# Patient Record
Sex: FEMALE | Race: WHITE | NOT HISPANIC OR LATINO | Employment: OTHER | URBAN - METROPOLITAN AREA
[De-identification: names, ages, dates, MRNs, and addresses within clinical notes are randomized per-mention and may not be internally consistent; named-entity substitution may affect disease eponyms.]

---

## 2017-07-02 ENCOUNTER — OFFICE VISIT - HEALTHEAST (OUTPATIENT)
Dept: FAMILY MEDICINE | Facility: CLINIC | Age: 67
End: 2017-07-02

## 2017-07-02 DIAGNOSIS — M79.602 LEFT ARM PAIN: ICD-10-CM

## 2017-07-02 ASSESSMENT — MIFFLIN-ST. JEOR: SCORE: 1286.11

## 2017-07-19 ENCOUNTER — RECORDS - HEALTHEAST (OUTPATIENT)
Dept: ADMINISTRATIVE | Facility: OTHER | Age: 67
End: 2017-07-19

## 2017-08-23 ENCOUNTER — RECORDS - HEALTHEAST (OUTPATIENT)
Dept: ADMINISTRATIVE | Facility: OTHER | Age: 67
End: 2017-08-23

## 2017-09-14 ENCOUNTER — RECORDS - HEALTHEAST (OUTPATIENT)
Dept: ADMINISTRATIVE | Facility: OTHER | Age: 67
End: 2017-09-14

## 2017-09-14 ENCOUNTER — OFFICE VISIT - HEALTHEAST (OUTPATIENT)
Dept: FAMILY MEDICINE | Facility: CLINIC | Age: 67
End: 2017-09-14

## 2017-09-14 DIAGNOSIS — M79.622 LEFT UPPER ARM PAIN: ICD-10-CM

## 2017-09-19 ENCOUNTER — RECORDS - HEALTHEAST (OUTPATIENT)
Dept: ADMINISTRATIVE | Facility: OTHER | Age: 67
End: 2017-09-19

## 2017-09-25 ENCOUNTER — RECORDS - HEALTHEAST (OUTPATIENT)
Dept: ADMINISTRATIVE | Facility: OTHER | Age: 67
End: 2017-09-25

## 2018-06-28 ENCOUNTER — HOSPITAL ENCOUNTER (EMERGENCY)
Facility: CLINIC | Age: 68
Discharge: HOME | End: 2018-06-28
Attending: STUDENT IN AN ORGANIZED HEALTH CARE EDUCATION/TRAINING PROGRAM | Admitting: STUDENT IN AN ORGANIZED HEALTH CARE EDUCATION/TRAINING PROGRAM
Payer: MEDICAID

## 2018-06-28 VITALS
TEMPERATURE: 98.1 F | SYSTOLIC BLOOD PRESSURE: 159 MMHG | OXYGEN SATURATION: 96 % | DIASTOLIC BLOOD PRESSURE: 91 MMHG | WEIGHT: 160 LBS | HEART RATE: 92 BPM | HEIGHT: 67 IN | BODY MASS INDEX: 25.11 KG/M2 | RESPIRATION RATE: 18 BRPM

## 2018-06-28 DIAGNOSIS — S81.811A LACERATION OF RIGHT LOWER LEG, INITIAL ENCOUNTER: ICD-10-CM

## 2018-06-28 PROBLEM — M54.9 CHRONIC BACK PAIN: Status: ACTIVE | Noted: 2018-06-28

## 2018-06-28 PROBLEM — G93.32 CHRONIC FATIGUE SYNDROME: Status: ACTIVE | Noted: 2018-06-28

## 2018-06-28 PROBLEM — F64.0 GENDER DYSPHORIA IN ADULT: Status: ACTIVE | Noted: 2017-02-21

## 2018-06-28 PROBLEM — F98.8 ADD (ATTENTION DEFICIT DISORDER): Status: ACTIVE | Noted: 2018-06-28

## 2018-06-28 PROBLEM — G89.29 CHRONIC BACK PAIN: Status: ACTIVE | Noted: 2018-06-28

## 2018-06-28 PROBLEM — E03.9 HYPOTHYROIDISM: Status: ACTIVE | Noted: 2018-06-28

## 2018-06-28 PROBLEM — F84.0 AUTISM SPECTRUM DISORDER: Status: ACTIVE | Noted: 2018-06-28

## 2018-06-28 PROBLEM — F41.9 ANXIETY: Status: ACTIVE | Noted: 2018-06-28

## 2018-06-28 PROCEDURE — 90715 TDAP VACCINE 7 YRS/> IM: CPT | Performed by: STUDENT IN AN ORGANIZED HEALTH CARE EDUCATION/TRAINING PROGRAM

## 2018-06-28 PROCEDURE — 12002 RPR S/N/AX/GEN/TRNK2.6-7.5CM: CPT | Mod: RT | Performed by: STUDENT IN AN ORGANIZED HEALTH CARE EDUCATION/TRAINING PROGRAM

## 2018-06-28 PROCEDURE — 99283 EMERGENCY DEPT VISIT LOW MDM: CPT | Mod: 25 | Performed by: STUDENT IN AN ORGANIZED HEALTH CARE EDUCATION/TRAINING PROGRAM

## 2018-06-28 PROCEDURE — 90471 IMMUNIZATION ADMIN: CPT | Performed by: STUDENT IN AN ORGANIZED HEALTH CARE EDUCATION/TRAINING PROGRAM

## 2018-06-28 PROCEDURE — 99283 EMERGENCY DEPT VISIT LOW MDM: CPT | Performed by: STUDENT IN AN ORGANIZED HEALTH CARE EDUCATION/TRAINING PROGRAM

## 2018-06-28 PROCEDURE — 25000128 H RX IP 250 OP 636: Performed by: STUDENT IN AN ORGANIZED HEALTH CARE EDUCATION/TRAINING PROGRAM

## 2018-06-28 PROCEDURE — 99282 EMERGENCY DEPT VISIT SF MDM: CPT | Mod: 25 | Performed by: STUDENT IN AN ORGANIZED HEALTH CARE EDUCATION/TRAINING PROGRAM

## 2018-06-28 RX ORDER — BUPIVACAINE HYDROCHLORIDE 2.5 MG/ML
INJECTION, SOLUTION INFILTRATION; PERINEURAL
Status: DISCONTINUED
Start: 2018-06-28 | End: 2018-06-29 | Stop reason: HOSPADM

## 2018-06-28 RX ADMIN — CLOSTRIDIUM TETANI TOXOID ANTIGEN (FORMALDEHYDE INACTIVATED), CORYNEBACTERIUM DIPHTHERIAE TOXOID ANTIGEN (FORMALDEHYDE INACTIVATED), BORDETELLA PERTUSSIS TOXOID ANTIGEN (GLUTARALDEHYDE INACTIVATED), BORDETELLA PERTUSSIS FILAMENTOUS HEMAGGLUTININ ANTIGEN (FORMALDEHYDE INACTIVATED), BORDETELLA PERTUSSIS PERTACTIN ANTIGEN, AND BORDETELLA PERTUSSIS FIMBRIAE 2/3 ANTIGEN 0.5 ML: 5; 2; 2.5; 5; 3; 5 INJECTION, SUSPENSION INTRAMUSCULAR at 22:59

## 2018-06-28 NOTE — ED AVS SNAPSHOT
[facility] Emergency Department    [address] [place] [address]    Phone:  [phone]    Fax:  [phone]                                       Carlita Allen   MRN: [id number]    Department:  [facility] Emergency Department   Date of Visit:  6/28/2018           Patient Information     Date Of Birth          1950        Your diagnoses for this visit were:     Laceration of right lower leg, initial encounter        You were seen by Blake Marin DO.      Follow-up Information     Follow up with Diane Bucio MD. Schedule an appointment as soon as possible for a visit in 10 days.    Specialty:  Family Practice    Why:  For reevaluation of wound and suture removal.    Contact information:    [address] [place] [address]          Discharge Instructions          * LACERATION (All Closures)  A laceration is a cut through the skin. This will usually require stitches (sutures) or staples if it is deep. Minor cuts may be treated with a tape closure ( Steri-Strips ) or Dermabond skin glue.       HOME CARE:  PAIN MEDICINE: You may use acetaminophen (Tylenol) 650-1000 mg every 6 hours or ibuprofen (Motrin, Advil) 600 mg every 6-8 hours with food to control pain, if you are able to take these medicines. [NOTE: If you have chronic liver or kidney disease or ever had a stomach ulcer or GI bleeding, talk with your doctor before using these medicines.]  EXTREMITY, FACE or TRUNK WOUNDS:    Keep the wound clean and dry. If a bandage was applied and it becomes wet or dirty, replace it. Otherwise, leave it in place for the first 24 hours.    If stitches or staples were used, clean the wound daily. Protect the wound from sunlight and tanning lamps.    After removing the bandage, wash the area with soap and water. Use a wet cotton swab (Q tip) to loosen and remove any blood or crust that forms.    After cleaning, apply a thin layer of Polysporin or Bacitracin ointment. This will keep the wound clean  and make it easier to remove the stitches or staples. Reapply a fresh bandage.    You may remove the bandage to shower as usual after the first 24 hours, but do not soak the area in water (no swimming) until the stitches or staples are removed.    If Steri-Strips were used, keep the area clean and dry. If it becomes wet, blot it dry with a towel. It is okay to take a brief shower, but avoid scrubbing the area.    If Dermabond skin adhesive was used, do not scratch, rub or pick at the adhesive film. Do not place tape directly over the film. Do not apply liquid, ointment or creams to the wound while the film is in place. Do not clean the wound with peroxide and do not apply ointments. Avoid activities that cause heavy sweating until the film has fallen off. Protect the wound from prolonged exposure to sunlight or tanning lamps. You may shower as usual but do not soak the wound in water (no baths or swimming). The film will fall off by itself in 5-10 days.  SCALP WOUNDS: During the first two days, you may carefully rinse your hair in the shower to remove blood, glass or dirt particles. After two days, you may shower and shampoo your hair normally. Do not soak your scalp in the tub or go swimming until the stitches or staples have been removed.  MOUTH WOUNDS: Eat soft foods to reduce pain. If the cut is inside of your mouth, clean by rinsing after each meal and at bedtime with a mixture of equal parts water and Hydrogen Peroxide (do not swallow!). Or, you can use a cotton swab to directly apply Hydrogen Peroxide onto the cut.  After the wound is done healing, use sunscreen over the area whenever exposed for the next 6 minths to avoid a darker scar.     FOLLOW UP: Most skin wounds heal within ten days. Mouth and facial wounds heal within five days. However, even with proper treatment, a wound infection may sometimes occur. Therefore, you should check the wound daily for signs of infection listed below.  Stitches should  be removed from the face within five days; stitches and staples should be removed from other parts of the body within 7-10 days. Unless you are told to come back to the emergency room, you may have your doctor or urgent care remove the stitches. If dissolving stitches were used in the mouth, these will fall out or dissolve without the need for removal. If tape closures ( Steri-Strips ) were used, remove them yourself if they have not fallen off after 7 days. If Dermabond skin glue was used, the film will fall off by itself in 5-10 days.      GET PROMPT MEDICAL ATTENTION  if any of the following occur:    Increasing pain in the wound    Redness, swelling or pus coming from the wound    Fever over 101 F (38.3 C) oral    If stitches or staples come apart or fall out or if Steri-Strips fall off before seven days    If the wound edges re-open    Bleeding not controlled by direct pressure    5289-2229 [employer]. [address] [place] [address], [place] [address]. All rights reserved. This information is not intended as a substitute for professional medical care. Always follow your healthcare professional's instructions.  This information has been modified by your health care provider with permission from the publisher.      24 Hour Appointment Hotline       To make an appointment at any [facility], call [phone] ([phone]). If you don't have a family doctor or clinic, we will help you find one. [facility] are conveniently located to serve the needs of you and your family.             Review of your medicines      Our records show that you are taking the medicines listed below. If these are incorrect, please call your family doctor or clinic.        Dose / Directions Last dose taken    LEVOTHYROXINE SODIUM PO        Refills:  0        loratadine 10 MG tablet   Commonly known as:  CLARITIN   Dose:  10 mg        Take 10 mg by mouth daily   Refills:  0        predniSONE 20 MG tablet   Commonly  "known as:  DELTASONE   Quantity:  10 tablet        Take two tablets (= 40mg) each day for 5 (five) days   Refills:  0        PROGESTERONE MICRONIZED PO        Refills:  0                Orders Needing Specimen Collection     None      Pending Results     No orders found from 2018 to 2018.            Pending Culture Results     No orders found from 2018 to 2018.            Pending Results Instructions     If you had any lab results that were not finalized at the time of your Discharge, you can call the ED Lab Result RN at [phone]. You will be contacted by this team for any positive Lab results or changes in treatment. The nurses are available 7 days a week from 10A to 6:30P.  You can leave a message 24 hours per day and they will return your call.        Test Results From Your Hospital Stay               Thank you for choosing [facility]       Thank you for choosing [facility] for your care. Our goal is always to provide you with excellent care. Hearing back from our patients is one way we can continue to improve our services. Please take a few minutes to complete the written survey that you may receive in the mail after you visit with us. Thank you!        [facility] Information     [employer] lets you send messages to your doctor, view your test results, renew your prescriptions, schedule appointments and more. To sign up, go to [email or web address] . Click on \"Log in\" on the left side of the screen, which will take you to the Welcome page. Then click on \"Sign up Now\" on the right side of the page.     You will be asked to enter the access code listed below, as well as some personal information. Please follow the directions to create your username and password.     Your access code is: [email or web address]  Expires: 2018 11:27 PM     Your access code will  in 90 days. If you need help or a new code, please call your [facility] clinic or [phone].        Care EveryWhere ID     This is your " Care EveryWhere ID. This could be used by other organizations to access your [facility] medical records  [id number]        Equal Access to Services     ALEXI APPLE : Shawn Celaya, sergio boothe, adriano chambers, stanley bingham. So [facility] [phone].    ATENCIÓN: Si habla español, tiene a felipe disposición servicios gratuitos de asistencia lingüística. Llame al [phone].    We comply with applicable federal civil rights laws and [place] laws. We do not discriminate on the basis of race, color, national origin, age, disability, sex, sexual orientation, or gender identity.            After Visit Summary       This is your record. Keep this with you and show to your community pharmacist(s) and doctor(s) at your next visit.

## 2018-06-28 NOTE — ED AVS SNAPSHOT
[facility] Emergency Department    [address] [facility] [address]    Phone:  [phone]    Fax:  [phone]                                       Carlita Allen   MRN: [id number]    Department:  [facility] Emergency Department   Date of Visit:  6/28/2018           After Visit Summary Signature Page     I have received my discharge instructions, and my questions have been answered. I have discussed any challenges I see with this plan with the nurse or doctor.    ..........................................................................................................................................  Patient/Patient Representative Signature      ..........................................................................................................................................  Patient Representative Print Name and Relationship to Patient    ..................................................               ................................................  Date                                            Time    ..........................................................................................................................................  Reviewed by Signature/Title    ...................................................              ..............................................  Date                                                            Time

## 2018-06-29 NOTE — ED NOTES
Cut leg around 1930 on edge of aluminum ramp  Has approx 3cm lac to leg  Bleeding is controlled  Needs Tdap

## 2018-06-29 NOTE — DISCHARGE INSTRUCTIONS
* LACERATION (All Closures)  A laceration is a cut through the skin. This will usually require stitches (sutures) or staples if it is deep. Minor cuts may be treated with a tape closure ( Steri-Strips ) or Dermabond skin glue.       HOME CARE:  PAIN MEDICINE: You may use acetaminophen (Tylenol) 650-1000 mg every 6 hours or ibuprofen (Motrin, Advil) 600 mg every 6-8 hours with food to control pain, if you are able to take these medicines. [NOTE: If you have chronic liver or kidney disease or ever had a stomach ulcer or GI bleeding, talk with your doctor before using these medicines.]  EXTREMITY, FACE or TRUNK WOUNDS:    Keep the wound clean and dry. If a bandage was applied and it becomes wet or dirty, replace it. Otherwise, leave it in place for the first 24 hours.    If stitches or staples were used, clean the wound daily. Protect the wound from sunlight and tanning lamps.    After removing the bandage, wash the area with soap and water. Use a wet cotton swab (Q tip) to loosen and remove any blood or crust that forms.    After cleaning, apply a thin layer of Polysporin or Bacitracin ointment. This will keep the wound clean and make it easier to remove the stitches or staples. Reapply a fresh bandage.    You may remove the bandage to shower as usual after the first 24 hours, but do not soak the area in water (no swimming) until the stitches or staples are removed.    If Steri-Strips were used, keep the area clean and dry. If it becomes wet, blot it dry with a towel. It is okay to take a brief shower, but avoid scrubbing the area.    If Dermabond skin adhesive was used, do not scratch, rub or pick at the adhesive film. Do not place tape directly over the film. Do not apply liquid, ointment or creams to the wound while the film is in place. Do not clean the wound with peroxide and do not apply ointments. Avoid activities that cause heavy sweating until the film has fallen off. Protect the wound from prolonged  exposure to sunlight or tanning lamps. You may shower as usual but do not soak the wound in water (no baths or swimming). The film will fall off by itself in 5-10 days.  SCALP WOUNDS: During the first two days, you may carefully rinse your hair in the shower to remove blood, glass or dirt particles. After two days, you may shower and shampoo your hair normally. Do not soak your scalp in the tub or go swimming until the stitches or staples have been removed.  MOUTH WOUNDS: Eat soft foods to reduce pain. If the cut is inside of your mouth, clean by rinsing after each meal and at bedtime with a mixture of equal parts water and Hydrogen Peroxide (do not swallow!). Or, you can use a cotton swab to directly apply Hydrogen Peroxide onto the cut.  After the wound is done healing, use sunscreen over the area whenever exposed for the next 6 minths to avoid a darker scar.     FOLLOW UP: Most skin wounds heal within ten days. Mouth and facial wounds heal within five days. However, even with proper treatment, a wound infection may sometimes occur. Therefore, you should check the wound daily for signs of infection listed below.  Stitches should be removed from the face within five days; stitches and staples should be removed from other parts of the body within 7-10 days. Unless you are told to come back to the emergency room, you may have your doctor or urgent care remove the stitches. If dissolving stitches were used in the mouth, these will fall out or dissolve without the need for removal. If tape closures ( Steri-Strips ) were used, remove them yourself if they have not fallen off after 7 days. If Dermabond skin glue was used, the film will fall off by itself in 5-10 days.      GET PROMPT MEDICAL ATTENTION  if any of the following occur:    Increasing pain in the wound    Redness, swelling or pus coming from the wound    Fever over 101 F (38.3 C) oral    If stitches or staples come apart or fall out or if Steri-Strips fall  off before seven days    If the wound edges re-open    Bleeding not controlled by direct pressure    [id number] [employer]. [address] [place] [address], [place] [address]. All rights reserved. This information is not intended as a substitute for professional medical care. Always follow your healthcare professional's instructions.  This information has been modified by your health care provider with permission from the publisher.

## 2018-06-29 NOTE — ED PROVIDER NOTES
History     Chief Complaint   Patient presents with     Laceration     right shin   cut on aluminium  ramp        HPI  Carlita Allen is a 67 year old female who presents for evaluation of right lower extremity laceration sustained around 7:30 PM this evening.  Patient explains that they had recently had a new aluminum ramp installed for access to the home but while walking around today sharp piece of aluminum cut across her right anterior shin.  She immediately felt pain and noted bleeding from the laceration.  Patient applied pressure and bleeding ceased prior to arrival to the department.  She knows it has been many years since her last tetanus shot and is requesting the immunization.  She denies knee pain, ankle pain, sensory deficits or weakness.  No other appreciable injuries.    Problem List:    Patient Active Problem List    Diagnosis Date Noted     ADD (attention deficit disorder) 06/28/2018     Priority: Medium     Anxiety 06/28/2018     Priority: Medium     Autism spectrum disorder 06/28/2018     Priority: Medium     Chronic back pain 06/28/2018     Priority: Medium     Chronic fatigue syndrome 06/28/2018     Priority: Medium     Overview:   Treated by Dr. Larry St with high dose testosterone and 2 thyroid   medications with good results.       Hypothyroidism 06/28/2018     Priority: Medium     Overview:   Presumed diagnosis as normal free T4 and normal free T3 on meds started   before getting care @ [facility]       Gender dysphoria in adult 02/21/2017     Priority: Medium     Depressive disorder 04/23/2007     Priority: Medium     Overview:   Depressive disorder, not elsewhere classified ([facility])       Residual type schizophrenic disorder, chronic condition (H) 04/23/2007     Priority: Medium        Past Medical History:    No past medical history on file.    Past Surgical History:    No past surgical history on file.    Family History:    No family history on file.    Social History:  Marital Status:   "Single [1]  Social History   Substance Use Topics     Smoking status: Never Smoker     Smokeless tobacco: Not on file     Alcohol use No        Medications:      LEVOTHYROXINE SODIUM PO   loratadine (CLARITIN) 10 MG tablet   predniSONE (DELTASONE) 20 MG tablet   PROGESTERONE MICRONIZED PO         Review of Systems  Constitutional:  Negative for fever or illness.  Musculoskeletal: Negative for knee pain, ankle pain, or bony injury.  Neurological:  Negative for sensory deficit or weakness.  Skin: Positive for right shin laceration.    All others reviewed and are negative.      Physical Exam   BP: (!) 159/91  Pulse: 92  Temp: 98.1  F (36.7  C)  Resp: 18  Height: 170.2 cm (5' 7\")  Weight: 72.6 kg (160 lb)  SpO2: 96 %      Physical Exam  Constitutional:  Well developed, well nourished.  Appears nontoxic and in no acute distress.   HENT:  Normocephalic and atraumatic.  Eyes:  Conjunctivae are normal.  Neck:  Neck supple.  Cardiovascular:  No cyanosis.   Respiratory:  Effort normal, no respiratory distress.   Musculoskeletal:  Knees are relatively symmetric in comparison.  No significant effusion, swelling, or inflammation.  No overlying contusions or ecchymosis.  Patient denies tenderness to palpation of the patella, distal femur, proximal or distal tibia/fibula.    Neurological:  Patient is alert.  Skin:  Skin is warm and dry.  4 cm laceration across right anterior tibia without identifiable foreign body or significant debris.  Psychiatric:  Normal mood and affect.      ED Course     ED Course     Procedures          [facility] Procedure Note       Procedure:  Laceration Repair   Performed by:  Blake Marin    Consent:  Patient who states an understanding of the procedure being performed after discussing the risks, benefits, and alternatives of the agreed method of wound repair.  They understand that although the wound has been cleaned/irrigated thoroughly, we cannot with absolute certainty prevent " infection and they must monitor the healing process closely for signs of infection.  Also, all skin wounds will form a scar but the repair will improve the cosmetic outcomes.    Body area: Right lower extremity  Laceration length:  4 cm  Contamination:  The wound is not contaminated.  Foreign bodies after meticulous investigation:  none  Tendon involvement:  none  Nerve involvement:  Sensation completely intact.  Anesthesia type:  Local  Local anesthetic agent:  Bupivacaine 0.5%  Anesthetic total:  4 ml  Irrigation:  Copious irrigation via normal saline.  Preparation:  Patient was cleaned and draped in usual sterile fashion for repair.    Debridement:  none  Skin closure:  Closed with 6 x 3.0 Ethilon  Technique:  interrupted  Approximation:  Close  Approximation difficulty:  simple    Patient tolerance: Patient tolerated the procedure well with no immediate complications.    Nursing staff was assigned to apply antibiotic ointment and dress the wound after the repair.     They have been instructed to monitor wound healing closely for any signs of infection.          Critical Care time:  none               No results found for this or any previous visit (from the past 24 hour(s)).    Medications   bupivacaine (MARCAINE) 0.25 % injection (not administered)   Tdap (tetanus-diphtheria-acell pertussis) (ADACEL) injection 0.5 mL (0.5 mLs Intramuscular Given 6/28/18 1034)       Assessments & Plan (with Medical Decision Making)   Carlita Allen is a 67 year old female who presents to the department for evaluation of right lower extremity laceration which occurred 3 hours prior to arrival. Based on her symptoms and the clinical examination, there is no evidence to suggest bony injury, tendon laceration, or neurovascular deficits. Her wound was cleaned, irrigated, thoroughly inspected and no foreign body or heavy contamination found.  Radiographic imaging deemed unnecessary.  The laceration repaired, wound edges well  approximated, and the patient tolerated well without complications.  After repair antibiotic ointment was applied and the wound was dressed.  They were instructed to remove the bandage within 24 hours and apply OTC antibiotic ointment twice daily while healing.  Recommended follow-up in 8-10 days at a primary care clinic for reevaluation of the wound and removal of sutures.     During the repair we discussed the likelihood that there will be some residual scarring.  Although the wound was thoroughly cleaned/irrigated, she has been instructed to monitor the wound closely for healing or signs of infection such as increasing pain, redness, discharge, or fever.  If any are present they should return to the emergency department.      Disclaimer: This note consists of symbols derived from keyboarding, dictation, and/or voice recognition software. As a result, there may be errors in the script that have gone undetected.  Please consider this when interpreting information found in the chart.         I have reviewed the nursing notes.    I have reviewed the findings, diagnosis, plan and need for follow up with the patient.       Discharge Medication List as of 6/28/2018 11:28 PM          Final diagnoses:   Laceration of right lower leg, initial encounter       6/28/2018   [facility] EMERGENCY DEPARTMENT     Blake Marin, DO  06/29/18 0046

## 2018-08-24 ENCOUNTER — OFFICE VISIT - HEALTHEAST (OUTPATIENT)
Dept: FAMILY MEDICINE | Facility: CLINIC | Age: 68
End: 2018-08-24

## 2018-08-24 DIAGNOSIS — M25.561 RIGHT KNEE PAIN: ICD-10-CM

## 2020-06-25 ENCOUNTER — NURSE TRIAGE (OUTPATIENT)
Dept: NURSING | Facility: CLINIC | Age: 70
End: 2020-06-25

## 2020-06-26 NOTE — TELEPHONE ENCOUNTER
"Patient states she has a lump on gum/inside of her mouth. She noticed the lump a few days ago, but looked at the lump today. Lump is 1/4th the size of a dime. She googled her symptoms and found out it could be an abscess. Lump is painful, rates it 4-5/10. Temp 98.5. Feel feverish. Swallowing fluids okay. No facial swelling. Reports her tooth \"feels funny for awhile and aches\" Reports she's had a ear ache and headache for awhile. Advised per protocol to see physician within 24 hours. Patent states she goes to [facility] and that is who she was trying to call. Advised patient to try calling them tomorrow to make an appointment with a dentist. And if they don't have any availability then trying to find another dentist.     Jeri Patel RN/[facility]    COVID 19 Nurse Triage Plan/Patient Instructions    Please be aware that novel coronavirus (COVID-19) may be circulating in the community. If you develop symptoms such as fever, cough, or SOB or if you have concerns about the presence of another infection including coronavirus (COVID-19), please contact your health care provider or visit [email or web address].     Disposition/Instructions    Patient to schedule an In Person Visit with provider. Reference Visit Selection Guide.    Thank you for taking steps to prevent the spread of this virus.  o Limit your contact with others.  o Wear a simple mask to cover your cough.  o Wash your hands well and often.    Resources    [facility]: About COVID-19: [email or web address]    CDC: What to Do If You're Sick: www.cdc.gov/coronavirus/2019-ncov/about/steps-when-sick.html    CDC: Ending Home Isolation: www.cdc.gov/coronavirus/2019-ncov/hcp/disposition-in-home-patients.html     CDC: Caring for Someone: www.cdc.gov/coronavirus/2019-ncov/if-you-are-sick/care-for-someone.html     MD: Interim Guidance for Hospital Discharge to Home: " "www.health.[facility].[place]/diseases/coronavirus/hcp/hospdischarge.pdf    [facility] clinical trials (COVID-19 research studies): [email or web address]/umn-[email or web address]-trials     Below are the COVID-19 hotlines at the [place] Department of Health ([facility]). Interpreters are available.   o For health questions: Call 792-229-0594 or 1-233.461.3789 (7 a.m. to 7 p.m.)  o For questions about schools and childcare: Call 419-993-2559 or 1-668.870.5092 (7 a.m. to 7 p.m.)       Additional Information    Negative: Large blisters in mouth (i.e., fluid filled bubbles or sacs)    Negative: Chemical in the mouth suspected cause of ulcers    Negative: [1] Drinking very little AND [2] dehydration suspected (e.g., no urine > 12 hours, very dry mouth, very lightheaded)    Negative: Generalized rash on body    Negative: Patient sounds very sick or weak to the triager    Negative: Gums are red, painful and have many ulcers    Negative: Fever    [1] One pimple or ulcer on the gum AND [2] near a toothache    Negative: [1] Looks like fever blisters (\"cold sore\") AND [2] only on outer lip    Negative: Generalized skin rash from Chickenpox    Facial swelling    Protocols used: MOUTH ULCERS-A-AH      "

## 2021-05-31 VITALS — WEIGHT: 163 LBS | BODY MASS INDEX: 26.31 KG/M2

## 2021-05-31 VITALS — BODY MASS INDEX: 26.2 KG/M2 | WEIGHT: 163 LBS | HEIGHT: 66 IN

## 2021-06-01 VITALS — BODY MASS INDEX: 25.22 KG/M2 | WEIGHT: 161 LBS

## 2021-06-11 NOTE — PROGRESS NOTES
"ASSESSMENT:   1. Left arm pain  XR Humerus Left 2 VWS    Ambulatory referral to Orthopedics        PLAN:  Suspect biceps tendonitis.  No bulge, h/o of \"pop\" or \"snap\" to suggest bicep tendon rupture.  Recommend rest, ice, elevate.  May use ibuprofen 400mg every 6-8 hours as needed for pain. Limit use to 5-7 days.  Take each dose with food. Referred to [facility] - to be seen in 1-2 weeks. Due to the chronicity of her pain, she may benefit from further evaluation of the biceps tendon with advanced imaging vs PT.     SUBJECTIVE:   Carlita Allen is a 66 y.o. female presents today with 2 months complaint of left upper arm pain. She points midway up the upper arm as the indicated area of pain.  She cannot recall any injury/trauma or recent heavy lifting.  Rates pain at a 8 out of 10.  Aggravated by movement of arm away from the body.  Alleviated by nothing.  She has attempted treatment with ice, heat, and massage without relief.  She reports a left humerus fracture in her 20's after falling off a horse but has not had chronic pain due to this. She is quite active.    Denies numbness/tingling, swelling or bruising.    There is no problem list on file for this patient.      History   Smoking Status     Never Smoker   Smokeless Tobacco     Never Used       Current Medications:  No current outpatient prescriptions on file prior to visit.     No current facility-administered medications on file prior to visit.        Allergies:   Allergies   Allergen Reactions     Metrizamide Other (See Comments)     Penicillins Other (See Comments)       OBJECTIVE:   Vitals:    07/02/17 1510   BP: 120/80   Patient Site: Right Arm   Patient Position: Sitting   Cuff Size: Adult Regular   Pulse: 86   Resp: 18   Temp: 97.6  F (36.4  C)   TempSrc: Oral   SpO2: 97%   Weight: 163 lb (73.9 kg)   Height: 5' 6\" (1.676 m)     Physical exam reveals a  66 y.o. female.   Appears healthy, alert, cooperative and in NAD.  Lungs: Chest is clear, no " wheezing, rhonchi or rales. Symmetric air entry throughout both lung fields.  Heart: regular rate and rhythm, no murmur, rub or gallop  Extremity: she has no swelling, ecchymosis, erythema, or warmth of the upper arm. She has tenderness of the body of the left biceps muscle. No tenderness of the biceps tendon, anterior deltoid, AC joint, or elbow.  Full AROM of the arm - tenderness of the indicated area with flexion at the elbow and abduction of the arm.    Skin: pink, warm, dry, and without lesions.    I personally did a preliminary review of xray: calcification midshaft humerus, from previous fx. No new fracture.  (pending final review by radiologist)

## 2021-06-13 NOTE — PROGRESS NOTES
Chief Complaint   Patient presents with     Arm Pain     was seen on 7/2 and has gotten worst. Left arm. Has a tare in her biceps       HPI    Patient is here for gradually worsening left upper arm pain since 7/2, not improved with OTC analgesics. Pain is now severe, constant, worsens with movement of arm. She was seen by Ortho and told to have ruptured biceps tendon and was treated conservatively. No fever, chills, tingling/numbness of left arm. No new injuries.     ROS: Pertinent ROS noted in HPI.     Allergies   Allergen Reactions     Metrizamide Other (See Comments)     Contrast dye     Penicillins Other (See Comments)       There is no problem list on file for this patient.      No family history on file.    Social History     Social History     Marital status: Single     Spouse name: N/A     Number of children: N/A     Years of education: N/A     Occupational History     Not on file.     Social History Main Topics     Smoking status: Never Smoker     Smokeless tobacco: Never Used     Alcohol use Not on file     Drug use: Not on file     Sexual activity: Not on file     Other Topics Concern     Not on file     Social History Narrative         Objective:    Vitals:    09/14/17 1826   BP: 120/78   Pulse: 98   Resp: 12   Temp: 98.1  F (36.7  C)   SpO2: 98%       Gen:NAD  MSK: normal inspection of left upper extremity. Moderate pain at left biceps without mass. Full ROM and strength of bilateral upper extremities.   Neuro: intact and symmetric gross sensation of bilateral upper extremities.       Left upper arm pain  -     HYDROcodone-acetaminophen 5-325 mg per tablet; Take 1 tablet by mouth every 4 (four) hours as needed for pain.      Recommended f/u with [facility] tomorrow (she was seen by [facility] for this problems previously).        77

## 2021-06-20 NOTE — PROGRESS NOTES
Subjective:      Patient ID: Carlita Allen is a 68 y.o. female.    Chief Complaint:    HPI  Carlita Allen is a 68 y.o. female who presents today complaining of right-sided knee pain.  Patient was seen by the [facility] today and had an MRI of the right knee.  He has follow-up for reevaluation on Monday.  Patient is ostensibly here for pain medication for knee pain for that evaluation.  She states that they did not give him medication at the [facility].  States he is having 9 out of 10 knee pain is constant and he would like to have something other than over-the-counter NSAID for relief.    No past medical history on file.    No past surgical history on file.    No family history on file.    Social History   Substance Use Topics     Smoking status: Never Smoker     Smokeless tobacco: Never Used     Alcohol use None       Review of Systems  As above in HPI otherwise negative  Objective:     /60  Pulse 82  Temp 98  F (36.7  C) (Oral)   Resp 16  Wt 161 lb (73 kg)  SpO2 98%  BMI 25.99 kg/m2    Physical Exam  General: Patient is ambulate into the office encounter  musculoskeletal: Right knee is without effusion.  No ecchymoses.  Patient is weightbearing and ambulating  Assessment:     Procedures    The encounter diagnosis was Right knee pain.    Plan:     Patient is continue with over-the-counter NSAID.  Pain medication as written below.  I did look on the prescription monitor webpage and the patient did not have any recent narcotic scripts filled.  Follow-up with [facility] on Monday as previously scheduled.  Patient is cautioned regarding risks and benefits of medication.    1. Right knee pain  traMADol (ULTRAM) 50 mg tablet         Patient Instructions     Tramadol as written up to 4 times per day for pain relief as needed.  Take with Tylenol for potentiation.  Patient is cautioned regarding risks and benefits of the medication to include impairment and sedation  Follow-up with  orthopedics on Monday for definitive evaluation treatment and interpretation of the MRI results    As a result of our visit today, here are the action plans for you:    1. Medication(s) to stop: There are no discontinued medications.    2. Medication(s) to start or change:   Medications Ordered   Medications     traMADol (ULTRAM) 50 mg tablet     Sig: Take 1 tablet (50 mg total) by mouth 4 (four) times a day for 3 days.     Dispense:  12 tablet     Refill:  0       3. Other instructions: Yes     Follow up with [facility] on Monday.

## 2021-08-29 ENCOUNTER — HOSPITAL ENCOUNTER (EMERGENCY)
Facility: CLINIC | Age: 71
Discharge: HOME | End: 2021-08-29
Attending: PHYSICIAN ASSISTANT | Admitting: PHYSICIAN ASSISTANT
Payer: COMMERCIAL

## 2021-08-29 VITALS
OXYGEN SATURATION: 97 % | SYSTOLIC BLOOD PRESSURE: 128 MMHG | TEMPERATURE: 98.4 F | HEIGHT: 67 IN | DIASTOLIC BLOOD PRESSURE: 83 MMHG | BODY MASS INDEX: 25.9 KG/M2 | RESPIRATION RATE: 18 BRPM | WEIGHT: 165 LBS | HEART RATE: 109 BPM

## 2021-08-29 DIAGNOSIS — H57.89 REDNESS OF LEFT EYE: ICD-10-CM

## 2021-08-29 DIAGNOSIS — H57.12 DISCOMFORT OF LEFT EYE: ICD-10-CM

## 2021-08-29 PROCEDURE — 99213 OFFICE O/P EST LOW 20 MIN: CPT | Performed by: PHYSICIAN ASSISTANT

## 2021-08-29 PROCEDURE — G0463 HOSPITAL OUTPT CLINIC VISIT: HCPCS | Performed by: PHYSICIAN ASSISTANT

## 2021-08-29 RX ORDER — AMLODIPINE BESYLATE 5 MG/1
TABLET ORAL
COMMUNITY
Start: 2021-03-10

## 2021-08-29 RX ORDER — MIRABEGRON 25 MG/1
25 TABLET, FILM COATED, EXTENDED RELEASE ORAL DAILY
COMMUNITY
Start: 2021-08-08

## 2021-08-29 RX ORDER — TETRACAINE HYDROCHLORIDE 5 MG/ML
1-2 SOLUTION OPHTHALMIC ONCE
Status: DISCONTINUED | OUTPATIENT
Start: 2021-08-29 | End: 2021-08-29 | Stop reason: HOSPADM

## 2021-08-29 RX ORDER — CIPROFLOXACIN HYDROCHLORIDE 3.5 MG/ML
1-2 SOLUTION/ DROPS TOPICAL EVERY 4 HOURS
Qty: 4.2 ML | Refills: 0 | Status: SHIPPED | OUTPATIENT
Start: 2021-08-29 | End: 2021-09-05

## 2021-08-29 ASSESSMENT — ENCOUNTER SYMPTOMS
EYE REDNESS: 1
EYE DISCHARGE: 1
EYE PAIN: 1
PHOTOPHOBIA: 0
EYE ITCHING: 1
FEVER: 0
CONSTITUTIONAL NEGATIVE: 1

## 2021-08-29 ASSESSMENT — TONOMETRY
OS_IOP_MMHG: 14
OD_IOP_MMHG: 14
IOP_HANDHELD: 1

## 2021-08-29 ASSESSMENT — MIFFLIN-ST. JEOR: SCORE: 1306.07

## 2021-08-29 ASSESSMENT — VISUAL ACUITY
OS: 20/10;WITH CORRECTIVE LENSES
OU: 1
OD: 20/10;WITH CORRECTIVE LENSES

## 2021-08-29 NOTE — ED PROVIDER NOTES
History     Chief Complaint   Patient presents with     Eye Pain     left eye pain, blurred vision and itchy for 1.5 weeks       HPI  Carliat Allen is a 71 year old female who presents with complaints of left eye redness, itching, and watery drainage for the past 2 weeks.  Pt states she initially thought allergies and the smokey air were causing her symptoms however her symptoms have persisted and now her eye is painful.  She has redness along the nasal aspect of her eye.  No mattering of the eye in the mornings.  Pt wears contacts.  They are monthly contacts but she changes them out every 3-4 days.  She has continued to wear her contacts.  Her vision has become more blurry.  Pt denies trauma to the eye or getting anything in the eye.  She has slight reported foreign body sensation.  No headaches.  No reported associated infectious or allergic symptoms; patient denies fevers, chills, sneezing, rhinorrhea, nasal congestion, cough, or sore throat.        Allergies:  Allergies   Allergen Reactions     Contrast Dye Other (See Comments)     Pcn [Penicillins] Other (See Comments)       Problem List:    Patient Active Problem List    Diagnosis Date Noted     ADD (attention deficit disorder) 06/28/2018     Priority: Medium     Anxiety 06/28/2018     Priority: Medium     Autism spectrum disorder 06/28/2018     Priority: Medium     Chronic back pain 06/28/2018     Priority: Medium     Chronic fatigue syndrome 06/28/2018     Priority: Medium     Overview:   Treated by Dr. Larry St with high dose testosterone and 2 thyroid   medications with good results.       Hypothyroidism 06/28/2018     Priority: Medium     Overview:   Presumed diagnosis as normal free T4 and normal free T3 on meds started   before getting care @ [facility]       Gender dysphoria in adult 02/21/2017     Priority: Medium     Depressive disorder 04/23/2007     Priority: Medium     Overview:   Depressive disorder, not elsewhere classified ([facility])       Residual  "type schizophrenic disorder, chronic condition (H) 04/23/2007     Priority: Medium        Past Medical History:    No past medical history on file.    Past Surgical History:    No past surgical history on file.    Family History:    No family history on file.    Social History:  Marital Status:  Single [1]  Social History     Tobacco Use     Smoking status: Never Smoker     Smokeless tobacco: Never Used   Substance Use Topics     Alcohol use: No     Drug use: No        Medications:    amLODIPine (NORVASC) 5 MG tablet  ciprofloxacin (CILOXAN) 0.3 % ophthalmic solution  LEVOTHYROXINE SODIUM PO  loratadine (CLARITIN) 10 MG tablet  MYRBETRIQ 25 MG 24 hr tablet  predniSONE (DELTASONE) 20 MG tablet          Review of Systems   Constitutional: Negative.  Negative for fever.   HENT: Negative.    Eyes: Positive for pain, discharge, redness, itching and visual disturbance (blurry). Negative for photophobia.   Skin: Negative.    All other systems reviewed and are negative.      Physical Exam   BP: 128/83  Pulse: 109  Temp: 98.4  F (36.9  C)  Resp: 18  Height: 170.2 cm (5' 7\")  Weight: 74.8 kg (165 lb)  SpO2: 97 %      Physical Exam  Constitutional:       General: She is not in acute distress.     Appearance: Normal appearance. She is well-developed. She is not ill-appearing, toxic-appearing or diaphoretic.   HENT:      Head: Normocephalic and atraumatic.      Right Ear: Tympanic membrane, ear canal and external ear normal.      Left Ear: Tympanic membrane, ear canal and external ear normal.      Nose: Nose normal. No congestion or rhinorrhea.      Mouth/Throat:      Lips: Pink.      Mouth: Mucous membranes are moist.      Pharynx: Oropharynx is clear. Uvula midline. No pharyngeal swelling, oropharyngeal exudate, posterior oropharyngeal erythema or uvula swelling.      Tonsils: No tonsillar exudate or tonsillar abscesses.   Eyes:      General: Lids are normal. Vision grossly intact.         Left eye: No foreign body, discharge " or hordeolum.      Intraocular pressure: Right eye pressure is 14 mmHg. Left eye pressure is 14 mmHg. Measurements were taken using a handheld tonometer.     Extraocular Movements: Extraocular movements intact.      Conjunctiva/sclera:      Left eye: Left conjunctiva is injected. No chemosis, exudate or hemorrhage.     Pupils: Pupils are equal, round, and reactive to light.      Left eye: No corneal abrasion or fluorescein uptake.      Slit lamp exam:     Left eye: No corneal flare, corneal ulcer or hyphema.      Comments: Injection and erythema along the nasal aspect of the left conjunctiva   Cardiovascular:      Rate and Rhythm: Normal rate and regular rhythm.      Heart sounds: Normal heart sounds.   Pulmonary:      Effort: Pulmonary effort is normal.      Breath sounds: Normal breath sounds.   Musculoskeletal:      Cervical back: Normal range of motion and neck supple. No rigidity.   Lymphadenopathy:      Cervical: No cervical adenopathy.   Skin:     General: Skin is warm and dry.   Neurological:      Mental Status: She is alert and oriented to person, place, and time.   Psychiatric:         Behavior: Behavior is cooperative.         ED Course        Procedures      No results found for this or any previous visit (from the past 24 hour(s)).    Medications - No data to display    Assessments & Plan (with Medical Decision Making)     Pt is a 71 year old female who presents with complaints of left eye redness, itching, and watery drainage for the past 2 weeks.  Pt states she initially thought allergies and the smokey air were causing her symptoms however her symptoms have persisted and now her eye is painful.  She has redness along the nasal aspect of her eye.  Pt wears contacts.      Pt is afebrile on arrival.  Exam as above.  Patient is noted to have injection and erythema along the nasal aspect of the left conjunctiva on exam.  No evidence for corneal abrasion or ulceration on exam.  No foreign body.  Normal  intraocular pressures noted to both eyes.  Instructed patient to not wear a contact in her left eye while she is having symptoms.  Will start an antibiotic eyedrop to cover for potential infection that may have been triggered by her contact lens.  Instructed her to follow-up closely with an eye doctor in the next day or 2 for a more comprehensive eye exam.  Return precautions were reviewed at length.  Hand-outs were provided.    Patient was sent with Ciprofloxacin ophthalmic solution and was instructed to follow-up with [facility] in 1-2 days for continued care and management or sooner if new or worsening symptoms.  She is to return to the ED for persistent and/or worsening symptoms.  Patient expressed understanding of the diagnosis and plan and was discharged home in good condition.    I have reviewed the nursing notes.    I have reviewed the findings, diagnosis, plan and need for follow up with the patient.    Discharge Medication List as of 8/29/2021 12:57 PM      START taking these medications    Details   ciprofloxacin (CILOXAN) 0.3 % ophthalmic solution Place 1-2 drops Into the left eye every 4 hours for 7 days, Disp-4.2 mL, R-0, E-Prescribe             Final diagnoses:   Redness of left eye   Discomfort of left eye       8/29/2021   [facility] EMERGENCY DEPT      Disclaimer:  This note consists of symbols derived from keyboarding, dictation and/or voice recognition software.  As a result, there may be errors in the script that have gone undetected.  Please consider this when interpreting information found in this chart.     Pamela Webber PA-C  08/29/21 1732       Pamela Webber PA-C  08/29/21 1733

## 2021-08-29 NOTE — DISCHARGE INSTRUCTIONS
There is no evidence of a corneal abrasion on today's exam.  No foreign body in the eye.  Your eye pressures are also normal today.  I do not believe you have bacterial conjunctivitis or pinkeye because you are not having purulent drainage from your eye.  Please avoid wearing a contact in this left eye for the next week.  We will start an antibiotic eyedrop to cover for potential infection that may have been triggered by her contact lens.  Please follow-up with an eye doctor in the next day or 2 for a more comprehensive eye exam.  Return to the emergency department right away should he develop any fevers, loss of vision, worsening symptoms of pain, or any other symptoms of concern.

## 2023-03-28 ENCOUNTER — APPOINTMENT (OUTPATIENT)
Dept: RADIOLOGY | Facility: CLINIC | Age: 73
End: 2023-03-28
Attending: EMERGENCY MEDICINE
Payer: COMMERCIAL

## 2023-03-28 ENCOUNTER — HOSPITAL ENCOUNTER (EMERGENCY)
Facility: CLINIC | Age: 73
Discharge: HOME | End: 2023-03-28
Attending: EMERGENCY MEDICINE | Admitting: EMERGENCY MEDICINE
Payer: COMMERCIAL

## 2023-03-28 VITALS
TEMPERATURE: 98.4 F | HEIGHT: 67 IN | WEIGHT: 165 LBS | DIASTOLIC BLOOD PRESSURE: 81 MMHG | BODY MASS INDEX: 25.9 KG/M2 | SYSTOLIC BLOOD PRESSURE: 153 MMHG | OXYGEN SATURATION: 94 % | RESPIRATION RATE: 19 BRPM | HEART RATE: 89 BPM

## 2023-03-28 DIAGNOSIS — R07.89 ATYPICAL CHEST PAIN: ICD-10-CM

## 2023-03-28 DIAGNOSIS — K44.9 HIATAL HERNIA: ICD-10-CM

## 2023-03-28 DIAGNOSIS — R31.21 ASYMPTOMATIC MICROSCOPIC HEMATURIA: ICD-10-CM

## 2023-03-28 LAB
ALBUMIN SERPL-MCNC: 4 G/DL (ref 3.5–5)
ALBUMIN UR-MCNC: NEGATIVE MG/DL
ALP SERPL-CCNC: 69 U/L (ref 45–120)
ALT SERPL W P-5'-P-CCNC: 22 U/L (ref 0–45)
ANION GAP SERPL CALCULATED.3IONS-SCNC: 10 MMOL/L (ref 5–18)
APPEARANCE UR: CLEAR
AST SERPL W P-5'-P-CCNC: 21 U/L (ref 0–40)
ATRIAL RATE - MUSE: 85 BPM
BASOPHILS # BLD AUTO: 0 10E3/UL (ref 0–0.2)
BASOPHILS NFR BLD AUTO: 1 %
BILIRUB SERPL-MCNC: 0.2 MG/DL (ref 0–1)
BILIRUB UR QL STRIP: NEGATIVE
BUN SERPL-MCNC: 18 MG/DL (ref 8–28)
CALCIUM SERPL-MCNC: 9 MG/DL (ref 8.5–10.5)
CHLORIDE BLD-SCNC: 107 MMOL/L (ref 98–107)
CO2 SERPL-SCNC: 22 MMOL/L (ref 22–31)
COLOR UR AUTO: ABNORMAL
CREAT SERPL-MCNC: 0.95 MG/DL (ref 0.6–1.1)
D DIMER PPP FEU-MCNC: 0.54 UG/ML FEU (ref 0–0.5)
DIASTOLIC BLOOD PRESSURE - MUSE: NORMAL MMHG
EOSINOPHIL # BLD AUTO: 0.2 10E3/UL (ref 0–0.7)
EOSINOPHIL NFR BLD AUTO: 3 %
ERYTHROCYTE [DISTWIDTH] IN BLOOD BY AUTOMATED COUNT: 13.2 % (ref 10–15)
FLUAV RNA SPEC QL NAA+PROBE: NEGATIVE
FLUBV RNA RESP QL NAA+PROBE: NEGATIVE
GFR SERPL CREATININE-BSD FRML MDRD: 64 ML/MIN/1.73M2
GLUCOSE BLD-MCNC: 98 MG/DL (ref 70–125)
GLUCOSE UR STRIP-MCNC: NEGATIVE MG/DL
HCT VFR BLD AUTO: 42.9 % (ref 35–47)
HGB BLD-MCNC: 14.9 G/DL (ref 11.7–15.7)
HGB UR QL STRIP: ABNORMAL
HOLD SPECIMEN: NORMAL
HOLD SPECIMEN: NORMAL
IMM GRANULOCYTES # BLD: 0 10E3/UL
IMM GRANULOCYTES NFR BLD: 0 %
INR PPP: 1.41 (ref 0.85–1.15)
INTERPRETATION ECG - MUSE: NORMAL
KETONES UR STRIP-MCNC: 10 MG/DL
LACTATE SERPL-SCNC: 0.9 MMOL/L (ref 0.7–2)
LEUKOCYTE ESTERASE UR QL STRIP: NEGATIVE
LIPASE SERPL-CCNC: 69 U/L (ref 0–52)
LYMPHOCYTES # BLD AUTO: 2.4 10E3/UL (ref 0.8–5.3)
LYMPHOCYTES NFR BLD AUTO: 36 %
MCH RBC QN AUTO: 29.4 PG (ref 26.5–33)
MCHC RBC AUTO-ENTMCNC: 34.7 G/DL (ref 31.5–36.5)
MCV RBC AUTO: 85 FL (ref 78–100)
MONOCYTES # BLD AUTO: 0.6 10E3/UL (ref 0–1.3)
MONOCYTES NFR BLD AUTO: 8 %
MUCOUS THREADS #/AREA URNS LPF: PRESENT /LPF
NEUTROPHILS # BLD AUTO: 3.5 10E3/UL (ref 1.6–8.3)
NEUTROPHILS NFR BLD AUTO: 52 %
NITRATE UR QL: NEGATIVE
NRBC # BLD AUTO: 0 10E3/UL
NRBC BLD AUTO-RTO: 0 /100
P AXIS - MUSE: 55 DEGREES
PH UR STRIP: 5.5 [PH] (ref 5–7)
PLATELET # BLD AUTO: 294 10E3/UL (ref 150–450)
POTASSIUM BLD-SCNC: 3.7 MMOL/L (ref 3.5–5)
PR INTERVAL - MUSE: 180 MS
PROT SERPL-MCNC: 6.6 G/DL (ref 6–8)
QRS DURATION - MUSE: 126 MS
QT - MUSE: 438 MS
QTC - MUSE: 521 MS
R AXIS - MUSE: 89 DEGREES
RBC # BLD AUTO: 5.07 10E6/UL (ref 3.8–5.2)
RBC URINE: 8 /HPF
RSV RNA SPEC NAA+PROBE: NEGATIVE
SARS-COV-2 RNA RESP QL NAA+PROBE: NEGATIVE
SODIUM SERPL-SCNC: 139 MMOL/L (ref 136–145)
SP GR UR STRIP: 1.02 (ref 1–1.03)
SYSTOLIC BLOOD PRESSURE - MUSE: NORMAL MMHG
T AXIS - MUSE: 90 DEGREES
TROPONIN I SERPL-MCNC: 0.02 NG/ML (ref 0–0.29)
TSH SERPL DL<=0.005 MIU/L-ACNC: 2.72 UIU/ML (ref 0.3–5)
UROBILINOGEN UR STRIP-MCNC: <2 MG/DL
VENTRICULAR RATE- MUSE: 85 BPM
WBC # BLD AUTO: 6.7 10E3/UL (ref 4–11)
WBC URINE: 2 /HPF

## 2023-03-28 PROCEDURE — 83690 ASSAY OF LIPASE: CPT | Performed by: EMERGENCY MEDICINE

## 2023-03-28 PROCEDURE — 93005 ELECTROCARDIOGRAM TRACING: CPT | Performed by: EMERGENCY MEDICINE

## 2023-03-28 PROCEDURE — 71046 X-RAY EXAM CHEST 2 VIEWS: CPT

## 2023-03-28 PROCEDURE — 250N000011 HC RX IP 250 OP 636: Performed by: EMERGENCY MEDICINE

## 2023-03-28 PROCEDURE — C9803 HOPD COVID-19 SPEC COLLECT: HCPCS

## 2023-03-28 PROCEDURE — 96374 THER/PROPH/DIAG INJ IV PUSH: CPT

## 2023-03-28 PROCEDURE — 87637 SARSCOV2&INF A&B&RSV AMP PRB: CPT | Performed by: EMERGENCY MEDICINE

## 2023-03-28 PROCEDURE — 85379 FIBRIN DEGRADATION QUANT: CPT | Performed by: EMERGENCY MEDICINE

## 2023-03-28 PROCEDURE — 99285 EMERGENCY DEPT VISIT HI MDM: CPT | Mod: CS,25

## 2023-03-28 PROCEDURE — 36415 COLL VENOUS BLD VENIPUNCTURE: CPT | Performed by: EMERGENCY MEDICINE

## 2023-03-28 PROCEDURE — 80053 COMPREHEN METABOLIC PANEL: CPT | Performed by: EMERGENCY MEDICINE

## 2023-03-28 PROCEDURE — 81001 URINALYSIS AUTO W/SCOPE: CPT | Performed by: EMERGENCY MEDICINE

## 2023-03-28 PROCEDURE — 85025 COMPLETE CBC W/AUTO DIFF WBC: CPT | Performed by: EMERGENCY MEDICINE

## 2023-03-28 PROCEDURE — 84443 ASSAY THYROID STIM HORMONE: CPT | Performed by: EMERGENCY MEDICINE

## 2023-03-28 PROCEDURE — 83605 ASSAY OF LACTIC ACID: CPT | Performed by: EMERGENCY MEDICINE

## 2023-03-28 PROCEDURE — 96375 TX/PRO/DX INJ NEW DRUG ADDON: CPT

## 2023-03-28 PROCEDURE — 85610 PROTHROMBIN TIME: CPT | Performed by: EMERGENCY MEDICINE

## 2023-03-28 PROCEDURE — 84484 ASSAY OF TROPONIN QUANT: CPT | Performed by: EMERGENCY MEDICINE

## 2023-03-28 RX ORDER — ONDANSETRON 2 MG/ML
4 INJECTION INTRAMUSCULAR; INTRAVENOUS ONCE
Status: COMPLETED | OUTPATIENT
Start: 2023-03-28 | End: 2023-03-28

## 2023-03-28 RX ORDER — DIMENHYDRINATE 50 MG/ML
25 INJECTION, SOLUTION INTRAMUSCULAR; INTRAVENOUS ONCE
Status: COMPLETED | OUTPATIENT
Start: 2023-03-28 | End: 2023-03-28

## 2023-03-28 RX ORDER — ONDANSETRON 4 MG/1
4-8 TABLET, ORALLY DISINTEGRATING ORAL EVERY 8 HOURS PRN
Qty: 10 TABLET | Refills: 0 | Status: SHIPPED | OUTPATIENT
Start: 2023-03-28 | End: 2023-03-31

## 2023-03-28 RX ADMIN — ONDANSETRON 4 MG: 2 INJECTION INTRAMUSCULAR; INTRAVENOUS at 04:42

## 2023-03-28 RX ADMIN — DIMENHYDRINATE 25 MG: 50 INJECTION, SOLUTION INTRAMUSCULAR; INTRAVENOUS at 03:36

## 2023-03-28 ASSESSMENT — ACTIVITIES OF DAILY LIVING (ADL)
ADLS_ACUITY_SCORE: 35
ADLS_ACUITY_SCORE: 33

## 2023-03-28 NOTE — DISCHARGE INSTRUCTIONS
Dramamine as needed for nausea  Tylenol 650 mg every 4 hours as needed for pain  Follow-up with your primary care doctor in the next 1 to 2 days for recheck.  Have your doctor recheck your urine to further evaluate the blood in your urine seen this morning  Return to the emergency department for worsening problems or concerns

## 2023-03-28 NOTE — ED PROVIDER NOTES
EMERGENCY DEPARTMENT ENCOUnter      NAME: Carlita Allen  AGE: 72 year old female  YOB: 1950  MRN: [id number]  EVALUATION DATE & TIME: 3/28/2023  2:44 AM    PCP: Diane Bucio    ED PROVIDER: Bebe Valencia MD      Chief Complaint   Patient presents with     Chest Pain     Nausea         FINAL IMPRESSION:  1. Atypical chest pain    2. Asymptomatic microscopic hematuria    3. Hiatal hernia          ED COURSE & MEDICAL DECISION MAKING:      In summary, the patient is a 72-year-old female that presents to the emergency department for evaluation of chest pain and nausea for the past few days.  She has no evidence of ACS with a normal troponin after a few days of chest pain, thromboembolism with a D-dimer normal for age,pneumonia, pneumothorax, or other more serious etiologies of his chest pain.  She is noted to have a hiatal hernia which could be causing his discomfort.  Recommended close outpatient follow-up for further care and evaluation.    0300-evaluated patient.  Offered pain medication which she declined.  Dramamine 25 mg IV was administered.  0430-nausea still present, Zofran 4 mg IV was administered.      At the conclusion of the encounter I discussed the results of all of the tests and the disposition. The questions were answered. The patient or family acknowledged understanding and was agreeable with the care plan.         MEDICATIONS GIVEN IN THE EMERGENCY:  Medications   dimenhyDRINATE (DRAMAMINE) injection 25 mg (25 mg Intravenous $Given 3/28/23 0336)   ondansetron (ZOFRAN) injection 4 mg (4 mg Intravenous $Given 3/28/23 5531)       NEW PRESCRIPTIONS STARTED AT TODAY'S ER VISIT  Discharge Medication List as of 3/28/2023  5:32 AM      START taking these medications    Details   ondansetron (ZOFRAN ODT) 4 MG ODT tab Take 1-2 tablets (4-8 mg) by mouth every 8 hours as needed for nausea, Disp-10 tablet, R-0, Local Print                 =================================================================    HPI        Carlita Allen is a 72 year old female presents to the emergency department for evaluation of right sided chest pain and nausea for the past few days.  She describes her pain as sharp, constant, 7 out of 10 in intensity, no radiation, and not relieved or exacerbated by any particular activity.  She has nausea but no vomiting.  She denies any shortness of breath or diaphoresis.  She denies any cough fevers or chills.  She denies any known ill exposures.  She denies any recent trauma.  She denies any recent long travel.      REVIEW OF SYSTEMS     Constitutional:  Denies fever or chills  HENT:  Denies sore throat   Respiratory:  Denies cough or shortness of breath   Cardiovascular:  Denies chest pain or palpitations  GI:  Denies abdominal pain, nausea, or vomiting  Musculoskeletal:  Denies any new extremity pain   Skin:  Denies rash   Neurologic:  Denies headache, focal weakness or sensory changes    All other systems reviewed and are negative      PAST MEDICAL HISTORY:  hypothyroid        CURRENT MEDICATIONS:    ondansetron (ZOFRAN ODT) 4 MG ODT tab  amLODIPine (NORVASC) 5 MG tablet  LEVOTHYROXINE SODIUM PO  loratadine (CLARITIN) 10 MG tablet  MYRBETRIQ 25 MG 24 hr tablet  predniSONE (DELTASONE) 20 MG tablet        ALLERGIES:  Allergies   Allergen Reactions     Contrast Dye Other (See Comments)     Pcn [Penicillins] Other (See Comments)       FAMILY HISTORY:  No family history on file.    SOCIAL HISTORY:   Social History     Socioeconomic History     Marital status: Single   Tobacco Use     Smoking status: Never     Smokeless tobacco: Never   Substance and Sexual Activity     Alcohol use: No     Drug use: No         PHYSICAL EXAM    Constitutional:  Well developed, Well nourished,  HENT:  Normocephalic, Atraumatic, Bilateral external ears normal, Oropharynx moist, Nose normal.   Neck:  Normal range of motion, No meningismus, No  stridor.   Eyes:  EOMI, Conjunctiva normal, No discharge.   Respiratory:  Normal breath sounds, No respiratory distress, No wheezing, No chest tenderness.   Cardiovascular:  Normal heart rate, Normal rhythm, No murmurs  GI:  Soft, No tenderness, No guarding, No CVA tenderness.   Musculoskeletal:  Neurovascularly intact distally, No edema, No tenderness, No cyanosis, Good range of motion in all major joints. No tenderness to palpation or major deformities noted.   Integument:  Warm, Dry, No erythema, No rash.   Lymphatic:  No lymphadenopathy noted.   Neurologic:  Alert & oriented, Normal motor function, Normal sensory function, No focal deficits noted.   Psychiatric:  Affect normal, Judgment normal, Mood normal.      LAB:  All pertinent labs reviewed and interpreted.  Results for orders placed or performed during the hospital encounter of 03/28/23   Chest XR,  PA & LAT    Impression    IMPRESSION: Cardiomediastinal silhouette within normal limits. No focal consolidation or pleural effusion. Moderate hiatal hernia. Nodular density overlying anterior chest on the lateral view not seen on the frontal image. Calcified granuloma not   excluded.   Result Value Ref Range    INR 1.41 (H) 0.85 - 1.15   Comprehensive metabolic panel   Result Value Ref Range    Sodium 139 136 - 145 mmol/L    Potassium 3.7 3.5 - 5.0 mmol/L    Chloride 107 98 - 107 mmol/L    Carbon Dioxide (CO2) 22 22 - 31 mmol/L    Anion Gap 10 5 - 18 mmol/L    Urea Nitrogen 18 8 - 28 mg/dL    Creatinine 0.95 0.60 - 1.10 mg/dL    Calcium 9.0 8.5 - 10.5 mg/dL    Glucose 98 70 - 125 mg/dL    Alkaline Phosphatase 69 45 - 120 U/L    AST 21 0 - 40 U/L    ALT 22 0 - 45 U/L    Protein Total 6.6 6.0 - 8.0 g/dL    Albumin 4.0 3.5 - 5.0 g/dL    Bilirubin Total 0.2 0.0 - 1.0 mg/dL    GFR Estimate 64 >60 mL/min/1.73m2   Result Value Ref Range    Lipase 69 (H) 0 - 52 U/L   Lactic acid whole blood   Result Value Ref Range    Lactic Acid 0.9 0.7 - 2.0 mmol/L   Result Value Ref  Range    Troponin I 0.02 0.00 - 0.29 ng/mL   UA with Microscopic reflex to Culture    Specimen: Urine, Midstream   Result Value Ref Range    Color Urine Light Yellow Colorless, Straw, Light Yellow, Yellow    Appearance Urine Clear Clear    Glucose Urine Negative Negative mg/dL    Bilirubin Urine Negative Negative    Ketones Urine 10 (A) Negative mg/dL    Specific Gravity Urine 1.020 1.001 - 1.030    Blood Urine 0.2 mg/dL (A) Negative    pH Urine 5.5 5.0 - 7.0    Protein Albumin Urine Negative Negative mg/dL    Urobilinogen Urine <2.0 <2.0 mg/dL    Nitrite Urine Negative Negative    Leukocyte Esterase Urine Negative Negative    Mucus Urine Present (A) None Seen /LPF    RBC Urine 8 (H) <=2 /HPF    WBC Urine 2 <=5 /HPF   CBC with platelets and differential   Result Value Ref Range    WBC Count 6.7 4.0 - 11.0 10e3/uL    RBC Count 5.07 3.80 - 5.20 10e6/uL    Hemoglobin 14.9 11.7 - 15.7 g/dL    Hematocrit 42.9 35.0 - 47.0 %    MCV 85 78 - 100 fL    MCH 29.4 26.5 - 33.0 pg    MCHC 34.7 31.5 - 36.5 g/dL    RDW 13.2 10.0 - 15.0 %    Platelet Count 294 150 - 450 10e3/uL    % Neutrophils 52 %    % Lymphocytes 36 %    % Monocytes 8 %    % Eosinophils 3 %    % Basophils 1 %    % Immature Granulocytes 0 %    NRBCs per 100 WBC 0 <1 /100    Absolute Neutrophils 3.5 1.6 - 8.3 10e3/uL    Absolute Lymphocytes 2.4 0.8 - 5.3 10e3/uL    Absolute Monocytes 0.6 0.0 - 1.3 10e3/uL    Absolute Eosinophils 0.2 0.0 - 0.7 10e3/uL    Absolute Basophils 0.0 0.0 - 0.2 10e3/uL    Absolute Immature Granulocytes 0.0 <=0.4 10e3/uL    Absolute NRBCs 0.0 10e3/uL   Extra Blue Top Tube   Result Value Ref Range    Hold Specimen [facility]    Extra Green Top (Lithium Heparin) ON ICE   Result Value Ref Range    Hold Specimen [facility]    Symptomatic Influenza A/B, RSV, & SARS-CoV2 PCR (COVID-19) Nasopharyngeal    Specimen: Nasopharyngeal; Swab   Result Value Ref Range    Influenza A PCR Negative Negative    Influenza B PCR Negative Negative    RSV PCR Negative Negative     SARS CoV2 PCR Negative Negative   TSH with free T4 reflex   Result Value Ref Range    TSH 2.72 0.30 - 5.00 uIU/mL   D dimer quantitative   Result Value Ref Range    D-Dimer Quantitative 0.54 (H) 0.00 - 0.50 ug/mL FEU   ECG 12-LEAD WITH MUSE (LHE)   Result Value Ref Range    Systolic Blood Pressure  mmHg    Diastolic Blood Pressure  mmHg    Ventricular Rate 85 BPM    Atrial Rate 85 BPM    DE Interval 180 ms    QRS Duration 126 ms     ms    QTc 521 ms    P Axis 55 degrees    R AXIS 89 degrees    T Axis 90 degrees    Interpretation ECG       Sinus rhythm  Non-specific intra-ventricular conduction block  Nonspecific T wave abnormality  Abnormal ECG  When compared with ECG of 2020 21:26,  Premature ventricular complexes are no longer Present  Non-specific change in ST segment in Anterior leads  Confirmed by SEE ED PROVIDER NOTE FOR, ECG INTERPRETATION (4000),  Valentino Brambila ([id number]) on 3/28/2023 5:50:23 AM         RADIOLOGY:  I have independently reviewed and interpreted the above imaging, pending the final radiology read.  Chest XR,  PA & LAT   Final Result   IMPRESSION: Cardiomediastinal silhouette within normal limits. No focal consolidation or pleural effusion. Moderate hiatal hernia. Nodular density overlying anterior chest on the lateral view not seen on the frontal image. Calcified granuloma not    excluded.          EK-rate is 85, sinus, minimal ST segment depression noted V4, V5 and V6 which is unchanged from her previous EKG of 2020  I have independently reviewed and interpreted this EKG          Bebe Valencia MD  Emergency Medicine  [facility] EMERGENCY ROOM  [facility] [facility] [id number] [phone]  Dept: [phone]       Bebe Valencia MD  [phone]

## 2023-03-28 NOTE — ED TRIAGE NOTES
Feeling off for a few days, intermittent chest pain for the last few days, tonight right sided chest pain and severe nausea.

## 2023-05-29 ENCOUNTER — HOSPITAL ENCOUNTER (EMERGENCY)
Facility: CLINIC | Age: 73
Discharge: HOME | End: 2023-05-29
Attending: EMERGENCY MEDICINE | Admitting: EMERGENCY MEDICINE
Payer: COMMERCIAL

## 2023-05-29 VITALS
OXYGEN SATURATION: 98 % | TEMPERATURE: 97.8 F | DIASTOLIC BLOOD PRESSURE: 88 MMHG | WEIGHT: 165 LBS | BODY MASS INDEX: 25.84 KG/M2 | SYSTOLIC BLOOD PRESSURE: 168 MMHG | RESPIRATION RATE: 20 BRPM | HEART RATE: 81 BPM

## 2023-05-29 DIAGNOSIS — R53.1 GENERALIZED WEAKNESS: ICD-10-CM

## 2023-05-29 DIAGNOSIS — E86.0 DEHYDRATION: ICD-10-CM

## 2023-05-29 PROBLEM — G47.33 OSA (OBSTRUCTIVE SLEEP APNEA): Status: ACTIVE | Noted: 2023-04-17

## 2023-05-29 PROBLEM — R07.89 NON-CARDIAC CHEST PAIN: Status: ACTIVE | Noted: 2020-07-09

## 2023-05-29 PROBLEM — I10 HYPERTENSION, ESSENTIAL: Status: ACTIVE | Noted: 2020-07-09

## 2023-05-29 PROBLEM — M19.90 OSTEOARTHRITIS (ARTHRITIS DUE TO WEAR AND TEAR OF JOINTS): Status: ACTIVE | Noted: 2018-07-07

## 2023-05-29 PROBLEM — Z86.79 HISTORY OF CARDIOMYOPATHY: Status: ACTIVE | Noted: 2020-07-09

## 2023-05-29 PROBLEM — Z91.09 ENVIRONMENTAL ALLERGIES: Status: ACTIVE | Noted: 2023-05-29

## 2023-05-29 PROBLEM — M47.819 SPONDYLOSIS WITHOUT MYELOPATHY: Status: ACTIVE | Noted: 2022-08-30

## 2023-05-29 LAB
ALBUMIN SERPL-MCNC: 4 G/DL (ref 3.5–5)
ALBUMIN UR-MCNC: 20 MG/DL
ALP SERPL-CCNC: 56 U/L (ref 45–120)
ALT SERPL W P-5'-P-CCNC: 15 U/L (ref 0–45)
ANION GAP SERPL CALCULATED.3IONS-SCNC: 8 MMOL/L (ref 5–18)
APPEARANCE UR: CLEAR
AST SERPL W P-5'-P-CCNC: 17 U/L (ref 0–40)
ATRIAL RATE - MUSE: 80 BPM
BASOPHILS # BLD AUTO: 0 10E3/UL (ref 0–0.2)
BASOPHILS NFR BLD AUTO: 1 %
BILIRUB SERPL-MCNC: 0.8 MG/DL (ref 0–1)
BILIRUB UR QL STRIP: NEGATIVE
BUN SERPL-MCNC: 15 MG/DL (ref 8–28)
CALCIUM SERPL-MCNC: 9.3 MG/DL (ref 8.5–10.5)
CHLORIDE BLD-SCNC: 107 MMOL/L (ref 98–107)
CO2 SERPL-SCNC: 26 MMOL/L (ref 22–31)
COLOR UR AUTO: YELLOW
CREAT SERPL-MCNC: 1.04 MG/DL (ref 0.6–1.1)
DIASTOLIC BLOOD PRESSURE - MUSE: NORMAL MMHG
EOSINOPHIL # BLD AUTO: 0.4 10E3/UL (ref 0–0.7)
EOSINOPHIL NFR BLD AUTO: 7 %
ERYTHROCYTE [DISTWIDTH] IN BLOOD BY AUTOMATED COUNT: 13.2 % (ref 10–15)
FLUAV RNA SPEC QL NAA+PROBE: NEGATIVE
FLUBV RNA RESP QL NAA+PROBE: NEGATIVE
GFR SERPL CREATININE-BSD FRML MDRD: 58 ML/MIN/1.73M2
GLUCOSE BLD-MCNC: 96 MG/DL (ref 70–125)
GLUCOSE UR STRIP-MCNC: NEGATIVE MG/DL
HCT VFR BLD AUTO: 47.3 % (ref 35–47)
HGB BLD-MCNC: 16.3 G/DL (ref 11.7–15.7)
HGB UR QL STRIP: ABNORMAL
HYALINE CASTS: 4 /LPF
IMM GRANULOCYTES # BLD: 0 10E3/UL
IMM GRANULOCYTES NFR BLD: 0 %
INTERPRETATION ECG - MUSE: NORMAL
KETONES UR STRIP-MCNC: 10 MG/DL
LEUKOCYTE ESTERASE UR QL STRIP: NEGATIVE
LYMPHOCYTES # BLD AUTO: 1.4 10E3/UL (ref 0.8–5.3)
LYMPHOCYTES NFR BLD AUTO: 27 %
MAGNESIUM SERPL-MCNC: 1.9 MG/DL (ref 1.8–2.6)
MCH RBC QN AUTO: 29 PG (ref 26.5–33)
MCHC RBC AUTO-ENTMCNC: 34.5 G/DL (ref 31.5–36.5)
MCV RBC AUTO: 84 FL (ref 78–100)
MONOCYTES # BLD AUTO: 0.6 10E3/UL (ref 0–1.3)
MONOCYTES NFR BLD AUTO: 11 %
MUCOUS THREADS #/AREA URNS LPF: PRESENT /LPF
NEUTROPHILS # BLD AUTO: 2.9 10E3/UL (ref 1.6–8.3)
NEUTROPHILS NFR BLD AUTO: 54 %
NITRATE UR QL: NEGATIVE
NRBC # BLD AUTO: 0 10E3/UL
NRBC BLD AUTO-RTO: 0 /100
P AXIS - MUSE: 52 DEGREES
PH UR STRIP: 5.5 [PH] (ref 5–7)
PLATELET # BLD AUTO: 283 10E3/UL (ref 150–450)
POTASSIUM BLD-SCNC: 3.9 MMOL/L (ref 3.5–5)
PR INTERVAL - MUSE: 190 MS
PROT SERPL-MCNC: 6.9 G/DL (ref 6–8)
QRS DURATION - MUSE: 122 MS
QT - MUSE: 432 MS
QTC - MUSE: 498 MS
R AXIS - MUSE: 80 DEGREES
RBC # BLD AUTO: 5.63 10E6/UL (ref 3.8–5.2)
RBC URINE: 13 /HPF
RSV RNA SPEC NAA+PROBE: NEGATIVE
SARS-COV-2 RNA RESP QL NAA+PROBE: NEGATIVE
SODIUM SERPL-SCNC: 141 MMOL/L (ref 136–145)
SP GR UR STRIP: 1.02 (ref 1–1.03)
SQUAMOUS EPITHELIAL: <1 /HPF
SYSTOLIC BLOOD PRESSURE - MUSE: NORMAL MMHG
T AXIS - MUSE: 102 DEGREES
TSH SERPL DL<=0.005 MIU/L-ACNC: 1.89 UIU/ML (ref 0.3–5)
UROBILINOGEN UR STRIP-MCNC: <2 MG/DL
VENTRICULAR RATE- MUSE: 80 BPM
WBC # BLD AUTO: 5.3 10E3/UL (ref 4–11)
WBC URINE: 2 /HPF

## 2023-05-29 PROCEDURE — 96360 HYDRATION IV INFUSION INIT: CPT

## 2023-05-29 PROCEDURE — 96361 HYDRATE IV INFUSION ADD-ON: CPT

## 2023-05-29 PROCEDURE — 93005 ELECTROCARDIOGRAM TRACING: CPT | Performed by: EMERGENCY MEDICINE

## 2023-05-29 PROCEDURE — 81001 URINALYSIS AUTO W/SCOPE: CPT | Performed by: EMERGENCY MEDICINE

## 2023-05-29 PROCEDURE — 87637 SARSCOV2&INF A&B&RSV AMP PRB: CPT | Performed by: EMERGENCY MEDICINE

## 2023-05-29 PROCEDURE — 84443 ASSAY THYROID STIM HORMONE: CPT | Performed by: EMERGENCY MEDICINE

## 2023-05-29 PROCEDURE — 36415 COLL VENOUS BLD VENIPUNCTURE: CPT | Performed by: EMERGENCY MEDICINE

## 2023-05-29 PROCEDURE — 83735 ASSAY OF MAGNESIUM: CPT | Performed by: EMERGENCY MEDICINE

## 2023-05-29 PROCEDURE — 99284 EMERGENCY DEPT VISIT MOD MDM: CPT | Mod: 25

## 2023-05-29 PROCEDURE — 80053 COMPREHEN METABOLIC PANEL: CPT | Performed by: EMERGENCY MEDICINE

## 2023-05-29 PROCEDURE — 85025 COMPLETE CBC W/AUTO DIFF WBC: CPT | Performed by: EMERGENCY MEDICINE

## 2023-05-29 PROCEDURE — 258N000003 HC RX IP 258 OP 636: Performed by: EMERGENCY MEDICINE

## 2023-05-29 RX ADMIN — SODIUM CHLORIDE 1000 ML: 9 INJECTION, SOLUTION INTRAVENOUS at 11:34

## 2023-05-29 ASSESSMENT — ENCOUNTER SYMPTOMS
FEVER: 0
SHORTNESS OF BREATH: 0
FATIGUE: 1
ABDOMINAL PAIN: 0
DYSURIA: 0
COUGH: 0

## 2023-05-29 ASSESSMENT — ACTIVITIES OF DAILY LIVING (ADL)
ADLS_ACUITY_SCORE: 33
ADLS_ACUITY_SCORE: 35

## 2023-05-29 NOTE — ED PROVIDER NOTES
EMERGENCY DEPARTMENT ENCOUNTER      NAME: Carlita Allen  AGE: 72 year old female  YOB: 1950  MRN: [id number]  EVALUATION DATE & TIME: 2023 11:01 AM    PCP: Brock Escobedo    ED PROVIDER: Amy Durham DO      Chief Complaint   Patient presents with     Generalized Weakness     Urinary Frequency         FINAL IMPRESSION:  1. Generalized weakness    2. Dehydration          ED COURSE & MEDICAL DECISION MAKIN-year-old female presented to the ED for evaluation of generalized weakness has been ongoing for last week.  The patient also reported urinary frequency, but states that she has urinary frequency at baseline.  The patient denies any pain, shortness of breath, nausea vomiting, or any other associated symptoms.  Here in the ED the patient was slightly hypertensive.  She was otherwise afebrile and hemodynamically stable.  She did not appear to be in any obvious distress or discomfort and she was not acutely ill-appearing at the time for initial evaluation.  On exam the patient was noted to have dry mucous membranes.  The remainder of her physical exam was unremarkable.  Following her initial evaluation the patient was given a liter of normal saline.    Urinalysis revealed ketones but there was no evidence of urinary tract infection.  The patient's hemoglobin was elevated at 16.3.  These 2 findings likely indicate dehydration.  The remainder of the CBC as well as a CMP and magnesium are reassuring.  TSH was normal.  COVID and influenza testing were negative.  EKG revealed normal sinus rhythm without any new or concerning ST or T wave changes.    The patient was reevaluated and informed of the reassuring lab and EKG results.  The patient reported minimal change in her symptoms with the IV fluids.  The patient was informed that her symptoms may be in part related to dehydration causing generalized weakness.  Despite the significant improvement in her symptoms the patient requested to return  home.  She was instructed to increase her fluid intake over the next few days.  She was instructed to follow-up with her primary care provider for reevaluation as needed or to return back to ED sooner for any worsening weakness, pain, shortness of breath, or any other new or concerning symptoms.    Pertinent Labs & Imaging studies reviewed. (See chart for details)  11:11 AM I met with the patient to gather history and to perform my initial exam. We discussed plans for the ED course, including diagnostic testing and treatment.   1:19 PM I rechecked on the patient.       At the conclusion of the encounter I discussed the results of all of the tests and the disposition. The questions were answered. The patient or family acknowledged understanding and was agreeable with the care plan.     Medical Decision Making    History:    Supplemental history from: Documented in chart, if applicable    External Record(s) reviewed: Documented in chart, if applicable.    Work Up:    Chart documentation includes differential considered and any EKGs or imaging independently interpreted by provider, where specified.    In additional to work up documented, I considered the following work up: Documented in chart, if applicable.    Complicating factors:    Care impacted by chronic illness: Hypertension    Care affected by social determinants of health: N/A    Disposition considerations: Discharge. No recommendations on prescription strength medication(s). N/A.       PPE worn: n95 mask, goggles    MEDICATIONS GIVEN IN THE EMERGENCY:  Medications   0.9% sodium chloride BOLUS (0 mLs Intravenous Stopped 5/29/23 1322)       NEW PRESCRIPTIONS STARTED AT TODAY'S ER VISIT  Discharge Medication List as of 5/29/2023  2:47 PM             =================================================================    HPI    Patient information was obtained from: patient    Use of : N/A        Carlita Allen is a 72 year old female with a pertinent  "history of autism spectrum disorder, hypothyroidism, hypertension, cardiomyopathy, VARGAS, spondylosis without myelopathy, and osteoarthritis who presents to this ED walk in by self for evaluation of fatigue and urinary symptoms.    Per chart review, patient presented to [facility] ED on 3/28/2023 for evaluation of chest pain and nausea for the past few days. Had no evidence of ACS. Normal troponin. D-dimer normal for age. Noted to have a hiatal hernia, which could have been contributing to discomfort. Labs notable for INR 1.41, Lipase 69, UA with RBC 8, blood 0.2 mg/dL, ketones 10. Given Dramamine 25 mg IV and Zofran 4 mg IV. Discharged with Zofran in stable condition with close follow up.     Patient presents with feeling of fatigue that has been present for the past 1.5 weeks as well as frequent urination and urinary incontinence that has been present for the past few weeks. 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 Afterwards, she felt exhausted and has \"never recovered\" since. She presented to her clinic on 5/25 and gave a urine sample and re-presented on 5/26 for a repeat urine sample, but she was never informed of the results. Per urinary symptoms, she reports seeing a Urologist for urinary incontinence after being seen in the ED on 3/28. At that time, her Myrbetriq medication was increased, but with no incontinence relief. She denies any recent medication changes or new medications.     Patient reports she drinks occasionally (last drink on 5/25/2023). Patient denies painful urination, urine odor, abdominal pain, chest pain, shortness of breath, cough, or fever. Patient does not report of any other medical concerns or complaints at this time.     REVIEW OF SYSTEMS   Review of Systems   Constitutional: Positive for fatigue. Negative for fever.   Respiratory: Negative for cough and shortness of breath.    Cardiovascular: Negative for chest pain.   Gastrointestinal: Negative for abdominal " pain.   Endocrine: Positive for polyuria.   Genitourinary: Negative for dysuria.        Positive for urinary incontinence. Negative for urine odor.   All other systems reviewed and are negative.    PAST MEDICAL HISTORY:  History reviewed. No pertinent past medical history.    PAST SURGICAL HISTORY:  History reviewed. No pertinent surgical history.        CURRENT MEDICATIONS:    amLODIPine (NORVASC) 5 MG tablet  LEVOTHYROXINE SODIUM PO  loratadine (CLARITIN) 10 MG tablet  MYRBETRIQ 25 MG 24 hr tablet  predniSONE (DELTASONE) 20 MG tablet        ALLERGIES:  Allergies   Allergen Reactions     Iodinated Contrast Media Other (See Comments)     Contrast dye       Contrast Dye Other (See Comments)     Gadolinium      Lisinopril Nausea and Vomiting and Nausea     Other reaction(s): Gastrointestinal, GI Upset     Losartan Other (See Comments)     Weight gain, abdominal edema, PN: Weight gain, abdominal edema  Weight gain, abdominal edema, PN: Weight gain, abdominal edema  Weight gain, abdominal edema, PN: Weight gain, abdominal edema       Pcn [Penicillins] Other (See Comments)     Gluten Meal Rash       FAMILY HISTORY:  History reviewed. No pertinent family history.    SOCIAL HISTORY:   Social History     Socioeconomic History     Marital status: Single     Spouse name: None     Number of children: None     Years of education: None     Highest education level: None   Tobacco Use     Smoking status: Never     Smokeless tobacco: Never   Substance and Sexual Activity     Alcohol use: No     Drug use: No       VITALS:  BP (!) 168/88   Pulse 81   Temp 97.8  F (36.6  C) (Temporal)   Resp 20   Wt 74.8 kg (165 lb)   SpO2 98%   BMI 25.84 kg/m      PHYSICAL EXAM    General presentation: Alert, Vital signs reviewed. NAD  HENT: ENT inspection is normal. Oropharynx is moist and clear. Dry MM.  Eye: Pupils are equal and reactive to light. EOMI  Neck: The neck is supple, with full ROM, with no evidence of meningismus.  Pulmonary:  Currently in no acute respiratory distress. Normal, non labored respirations, the lung sounds are normal with good equal air movement. Clear to auscultation bilaterally.   Circulatory: Regular rate and rhythm. Peripheral pulses are strong and equal. No murmurs, rubs, or gallops.   Abdominal: The abdomen is soft. Nontender. No rigidity, guarding, or rebound. Bowel sounds normal.   Neurologic: Alert, oriented to person, place, and time. No motor deficit. No sensory deficit. Cranial nerves II through XII are intact.  Musculoskeletal: No extremity tenderness. Full range of motion in all extremities. No extremity edema.   Skin: Skin color is normal. No rash. Warm. Dry to touch.      LAB:  All pertinent labs reviewed and interpreted.  Results for orders placed or performed during the hospital encounter of 05/29/23   UA with Microscopic reflex to Culture    Specimen: Urine, Clean Catch   Result Value Ref Range    Color Urine Yellow Colorless, Straw, Light Yellow, Yellow    Appearance Urine Clear Clear    Glucose Urine Negative Negative mg/dL    Bilirubin Urine Negative Negative    Ketones Urine 10 (A) Negative mg/dL    Specific Gravity Urine 1.021 1.001 - 1.030    Blood Urine 0.5 mg/dL (A) Negative    pH Urine 5.5 5.0 - 7.0    Protein Albumin Urine 20 (A) Negative mg/dL    Urobilinogen Urine <2.0 <2.0 mg/dL    Nitrite Urine Negative Negative    Leukocyte Esterase Urine Negative Negative    Mucus Urine Present (A) None Seen /LPF    RBC Urine 13 (H) <=2 /HPF    WBC Urine 2 <=5 /HPF    Squamous Epithelials Urine <1 <=1 /HPF    Hyaline Casts Urine 4 (H) <=2 /LPF   Comprehensive metabolic panel   Result Value Ref Range    Sodium 141 136 - 145 mmol/L    Potassium 3.9 3.5 - 5.0 mmol/L    Chloride 107 98 - 107 mmol/L    Carbon Dioxide (CO2) 26 22 - 31 mmol/L    Anion Gap 8 5 - 18 mmol/L    Urea Nitrogen 15 8 - 28 mg/dL    Creatinine 1.04 0.60 - 1.10 mg/dL    Calcium 9.3 8.5 - 10.5 mg/dL    Glucose 96 70 - 125 mg/dL    Alkaline  Phosphatase 56 45 - 120 U/L    AST 17 0 - 40 U/L    ALT 15 0 - 45 U/L    Protein Total 6.9 6.0 - 8.0 g/dL    Albumin 4.0 3.5 - 5.0 g/dL    Bilirubin Total 0.8 0.0 - 1.0 mg/dL    GFR Estimate 58 (L) >60 mL/min/1.73m2   Result Value Ref Range    Magnesium 1.9 1.8 - 2.6 mg/dL   Symptomatic Influenza A/B, RSV, & SARS-CoV2 PCR (COVID-19) Nasopharyngeal    Specimen: Nasopharyngeal; Swab   Result Value Ref Range    Influenza A PCR Negative Negative    Influenza B PCR Negative Negative    RSV PCR Negative Negative    SARS CoV2 PCR Negative Negative   CBC with platelets and differential   Result Value Ref Range    WBC Count 5.3 4.0 - 11.0 10e3/uL    RBC Count 5.63 (H) 3.80 - 5.20 10e6/uL    Hemoglobin 16.3 (H) 11.7 - 15.7 g/dL    Hematocrit 47.3 (H) 35.0 - 47.0 %    MCV 84 78 - 100 fL    MCH 29.0 26.5 - 33.0 pg    MCHC 34.5 31.5 - 36.5 g/dL    RDW 13.2 10.0 - 15.0 %    Platelet Count 283 150 - 450 10e3/uL    % Neutrophils 54 %    % Lymphocytes 27 %    % Monocytes 11 %    % Eosinophils 7 %    % Basophils 1 %    % Immature Granulocytes 0 %    NRBCs per 100 WBC 0 <1 /100    Absolute Neutrophils 2.9 1.6 - 8.3 10e3/uL    Absolute Lymphocytes 1.4 0.8 - 5.3 10e3/uL    Absolute Monocytes 0.6 0.0 - 1.3 10e3/uL    Absolute Eosinophils 0.4 0.0 - 0.7 10e3/uL    Absolute Basophils 0.0 0.0 - 0.2 10e3/uL    Absolute Immature Granulocytes 0.0 <=0.4 10e3/uL    Absolute NRBCs 0.0 10e3/uL   TSH with free T4 reflex   Result Value Ref Range    TSH 1.89 0.30 - 5.00 uIU/mL   ECG 12-LEAD WITH MUSE (LHE)   Result Value Ref Range    Systolic Blood Pressure  mmHg    Diastolic Blood Pressure  mmHg    Ventricular Rate 80 BPM    Atrial Rate 80 BPM    NM Interval 190 ms    QRS Duration 122 ms     ms    QTc 498 ms    P Axis 52 degrees    R AXIS 80 degrees    T Axis 102 degrees    Interpretation ECG       Sinus rhythm  Non-specific intra-ventricular conduction delay  T wave abnormality, consider lateral ischemia  Abnormal ECG  When compared with ECG  of 28-MAR-2023 03:48,  No significant change was found  Confirmed by SEE ED PROVIDER NOTE FOR, ECG INTERPRETATION (4000),  JORDAN TORRES ([id number]) on 5/29/2023 2:44:15 PM         RADIOLOGY:  Reviewed all pertinent imaging. Please see official radiology report.  No orders to display       EKG:    Normal sinus rhythm.  Rate of 80.  Widened QRS.  Normal QT.  Nonspecific ST and T wave changes.  Compared to the EKG on 3/28/2023 the QT is no longer prolonged.  No other significant changes are noted.    I have independently reviewed and interpreted the EKG(s) documented above.        I, Sg Rockwell, am serving as a scribe to document services personally performed by Amy Durham DO based on my observation and the provider's statements to me. I, Amy Durham, attest that Sg Rockwell is acting in a scribe capacity, has observed my performance of the services and has documented them in accordance with my direction.    Amy Durham DO  Emergency Medicine  [facility] EMERGENCY ROOM  [id number] [facility] [id number] [phone]     Amy Durham DO  05/29/23 6144

## 2023-05-29 NOTE — ED TRIAGE NOTES
Pt arrives to ED with c/o increased generalized weakness and urinary frequency and urgency. Pt states she went to the clinic ([facility]) twice last week and left a urine sample. Pt unsure about results. Denies any pain or fevers.      Triage Assessment     Row Name 05/29/23 1051       Triage Assessment (Adult)    Airway WDL WDL       Respiratory WDL    Respiratory WDL WDL       Skin Circulation/Temperature WDL    Skin Circulation/Temperature WDL WDL       Cardiac WDL    Cardiac WDL WDL       Peripheral/Neurovascular WDL    Peripheral Neurovascular WDL WDL       Cognitive/Neuro/Behavioral WDL    Cognitive/Neuro/Behavioral WDL WDL

## 2023-05-29 NOTE — DISCHARGE INSTRUCTIONS
Other than showing evidence of dehydration all of your laboratory tests appear reassuring.  COVID and influenza test were negative and your EKG was reassuring.    It is possible that your fatigue is in part related to dehydration.  It is therefore recommended that you increase your fluid intake over the next few days.    Follow-up with your primary care provider for routine reevaluation within the next few days if your generalized weakness continues.  Return back to ED sooner for any worsening weakness or any other new or concerning symptoms.

## 2023-06-02 ENCOUNTER — HEALTH MAINTENANCE LETTER (OUTPATIENT)
Age: 73
End: 2023-06-02

## 2023-06-19 ENCOUNTER — APPOINTMENT (OUTPATIENT)
Dept: CT IMAGING | Facility: CLINIC | Age: 73
End: 2023-06-19
Attending: EMERGENCY MEDICINE
Payer: COMMERCIAL

## 2023-06-19 PROCEDURE — 99285 EMERGENCY DEPT VISIT HI MDM: CPT | Mod: 25

## 2023-06-19 PROCEDURE — 74176 CT ABD & PELVIS W/O CONTRAST: CPT

## 2023-06-19 PROCEDURE — 96374 THER/PROPH/DIAG INJ IV PUSH: CPT | Mod: 59

## 2023-06-19 PROCEDURE — 96375 TX/PRO/DX INJ NEW DRUG ADDON: CPT

## 2023-06-19 PROCEDURE — 93005 ELECTROCARDIOGRAM TRACING: CPT | Performed by: EMERGENCY MEDICINE

## 2023-06-19 PROCEDURE — 96361 HYDRATE IV INFUSION ADD-ON: CPT

## 2023-06-19 RX ORDER — ONDANSETRON 2 MG/ML
4 INJECTION INTRAMUSCULAR; INTRAVENOUS ONCE
Status: COMPLETED | OUTPATIENT
Start: 2023-06-19 | End: 2023-06-20

## 2023-06-20 ENCOUNTER — HOSPITAL ENCOUNTER (EMERGENCY)
Facility: CLINIC | Age: 73
Discharge: HOME | End: 2023-06-20
Attending: EMERGENCY MEDICINE | Admitting: EMERGENCY MEDICINE
Payer: COMMERCIAL

## 2023-06-20 VITALS
HEIGHT: 66 IN | HEART RATE: 96 BPM | TEMPERATURE: 98.2 F | DIASTOLIC BLOOD PRESSURE: 60 MMHG | RESPIRATION RATE: 18 BRPM | BODY MASS INDEX: 26.52 KG/M2 | SYSTOLIC BLOOD PRESSURE: 120 MMHG | OXYGEN SATURATION: 93 % | WEIGHT: 165 LBS

## 2023-06-20 DIAGNOSIS — A09 DIARRHEA OF INFECTIOUS ORIGIN: ICD-10-CM

## 2023-06-20 DIAGNOSIS — K52.9 NON-SPECIFIC COLITIS: ICD-10-CM

## 2023-06-20 LAB
ALBUMIN SERPL-MCNC: 4.1 G/DL (ref 3.5–5)
ALBUMIN UR-MCNC: 200 MG/DL
ALP SERPL-CCNC: 75 U/L (ref 45–120)
ALT SERPL W P-5'-P-CCNC: 38 U/L (ref 0–45)
ANION GAP SERPL CALCULATED.3IONS-SCNC: 14 MMOL/L (ref 5–18)
APPEARANCE UR: ABNORMAL
AST SERPL W P-5'-P-CCNC: 33 U/L (ref 0–40)
ATRIAL RATE - MUSE: 117 BPM
BASOPHILS # BLD AUTO: 0.1 10E3/UL (ref 0–0.2)
BASOPHILS NFR BLD AUTO: 1 %
BILIRUB SERPL-MCNC: 0.5 MG/DL (ref 0–1)
BILIRUB UR QL STRIP: NEGATIVE
BUN SERPL-MCNC: 11 MG/DL (ref 8–28)
C REACTIVE PROTEIN LHE: 11.6 MG/DL (ref 0–?)
CALCIUM SERPL-MCNC: 10.2 MG/DL (ref 8.5–10.5)
CHLORIDE BLD-SCNC: 101 MMOL/L (ref 98–107)
CO2 SERPL-SCNC: 22 MMOL/L (ref 22–31)
COLOR UR AUTO: YELLOW
CREAT SERPL-MCNC: 1.3 MG/DL (ref 0.6–1.1)
DIASTOLIC BLOOD PRESSURE - MUSE: NORMAL MMHG
EOSINOPHIL # BLD AUTO: 0 10E3/UL (ref 0–0.7)
EOSINOPHIL NFR BLD AUTO: 0 %
ERYTHROCYTE [DISTWIDTH] IN BLOOD BY AUTOMATED COUNT: 13.1 % (ref 10–15)
GFR SERPL CREATININE-BSD FRML MDRD: 44 ML/MIN/1.73M2
GLUCOSE BLD-MCNC: 129 MG/DL (ref 70–125)
GLUCOSE UR STRIP-MCNC: NEGATIVE MG/DL
GRANULAR CAST: 13 /LPF
HCT VFR BLD AUTO: 51 % (ref 35–47)
HEMOCCULT STL QL: POSITIVE
HGB BLD-MCNC: 17.6 G/DL (ref 11.7–15.7)
HGB UR QL STRIP: ABNORMAL
HYALINE CASTS: 46 /LPF
IMM GRANULOCYTES # BLD: 0 10E3/UL
IMM GRANULOCYTES NFR BLD: 0 %
INTERPRETATION ECG - MUSE: NORMAL
KETONES UR STRIP-MCNC: 20 MG/DL
LACTATE SERPL-SCNC: 1.2 MMOL/L (ref 0.7–2)
LEUKOCYTE ESTERASE UR QL STRIP: NEGATIVE
LIPASE SERPL-CCNC: 27 U/L (ref 0–52)
LYMPHOCYTES # BLD AUTO: 1.3 10E3/UL (ref 0.8–5.3)
LYMPHOCYTES NFR BLD AUTO: 14 %
MAGNESIUM SERPL-MCNC: 1.9 MG/DL (ref 1.8–2.6)
MCH RBC QN AUTO: 28.9 PG (ref 26.5–33)
MCHC RBC AUTO-ENTMCNC: 34.5 G/DL (ref 31.5–36.5)
MCV RBC AUTO: 84 FL (ref 78–100)
MONOCYTES # BLD AUTO: 1.1 10E3/UL (ref 0–1.3)
MONOCYTES NFR BLD AUTO: 12 %
MUCOUS THREADS #/AREA URNS LPF: PRESENT /LPF
NEUTROPHILS # BLD AUTO: 6.7 10E3/UL (ref 1.6–8.3)
NEUTROPHILS NFR BLD AUTO: 73 %
NITRATE UR QL: NEGATIVE
NRBC # BLD AUTO: 0 10E3/UL
NRBC BLD AUTO-RTO: 0 /100
P AXIS - MUSE: 69 DEGREES
PH UR STRIP: 6 [PH] (ref 5–7)
PLATELET # BLD AUTO: 273 10E3/UL (ref 150–450)
POTASSIUM BLD-SCNC: 4 MMOL/L (ref 3.5–5)
PR INTERVAL - MUSE: 148 MS
PROT SERPL-MCNC: 7.7 G/DL (ref 6–8)
QRS DURATION - MUSE: 122 MS
QT - MUSE: 352 MS
QTC - MUSE: 491 MS
R AXIS - MUSE: 96 DEGREES
RBC # BLD AUTO: 6.08 10E6/UL (ref 3.8–5.2)
RBC URINE: 18 /HPF
SODIUM SERPL-SCNC: 137 MMOL/L (ref 136–145)
SP GR UR STRIP: 1.02 (ref 1–1.03)
SQUAMOUS EPITHELIAL: 1 /HPF
SYSTOLIC BLOOD PRESSURE - MUSE: NORMAL MMHG
T AXIS - MUSE: -28 DEGREES
TROPONIN I SERPL-MCNC: 0.02 NG/ML (ref 0–0.29)
UROBILINOGEN UR STRIP-MCNC: <2 MG/DL
VENTRICULAR RATE- MUSE: 117 BPM
WBC # BLD AUTO: 9.3 10E3/UL (ref 4–11)
WBC URINE: 5 /HPF

## 2023-06-20 PROCEDURE — 250N000011 HC RX IP 250 OP 636: Performed by: EMERGENCY MEDICINE

## 2023-06-20 PROCEDURE — 258N000003 HC RX IP 258 OP 636: Performed by: EMERGENCY MEDICINE

## 2023-06-20 PROCEDURE — 85025 COMPLETE CBC W/AUTO DIFF WBC: CPT | Performed by: EMERGENCY MEDICINE

## 2023-06-20 PROCEDURE — 250N000013 HC RX MED GY IP 250 OP 250 PS 637: Performed by: EMERGENCY MEDICINE

## 2023-06-20 PROCEDURE — 81001 URINALYSIS AUTO W/SCOPE: CPT | Performed by: EMERGENCY MEDICINE

## 2023-06-20 PROCEDURE — 83690 ASSAY OF LIPASE: CPT | Performed by: EMERGENCY MEDICINE

## 2023-06-20 PROCEDURE — 83735 ASSAY OF MAGNESIUM: CPT | Performed by: EMERGENCY MEDICINE

## 2023-06-20 PROCEDURE — 87506 IADNA-DNA/RNA PROBE TQ 6-11: CPT | Performed by: EMERGENCY MEDICINE

## 2023-06-20 PROCEDURE — 84484 ASSAY OF TROPONIN QUANT: CPT | Performed by: EMERGENCY MEDICINE

## 2023-06-20 PROCEDURE — 86140 C-REACTIVE PROTEIN: CPT | Performed by: EMERGENCY MEDICINE

## 2023-06-20 PROCEDURE — 83605 ASSAY OF LACTIC ACID: CPT | Performed by: EMERGENCY MEDICINE

## 2023-06-20 PROCEDURE — 36415 COLL VENOUS BLD VENIPUNCTURE: CPT | Performed by: EMERGENCY MEDICINE

## 2023-06-20 PROCEDURE — 82272 OCCULT BLD FECES 1-3 TESTS: CPT | Performed by: EMERGENCY MEDICINE

## 2023-06-20 PROCEDURE — 80053 COMPREHEN METABOLIC PANEL: CPT | Performed by: EMERGENCY MEDICINE

## 2023-06-20 RX ORDER — MORPHINE SULFATE 4 MG/ML
4 INJECTION, SOLUTION INTRAMUSCULAR; INTRAVENOUS ONCE
Status: COMPLETED | OUTPATIENT
Start: 2023-06-20 | End: 2023-06-20

## 2023-06-20 RX ORDER — CIPROFLOXACIN 500 MG/1
500 TABLET, FILM COATED ORAL 2 TIMES DAILY
Qty: 6 TABLET | Refills: 0 | Status: SHIPPED | OUTPATIENT
Start: 2023-06-20 | End: 2023-06-23

## 2023-06-20 RX ORDER — CIPROFLOXACIN 500 MG/1
500 TABLET, FILM COATED ORAL ONCE
Status: COMPLETED | OUTPATIENT
Start: 2023-06-20 | End: 2023-06-20

## 2023-06-20 RX ADMIN — CIPROFLOXACIN HYDROCHLORIDE 500 MG: 500 TABLET, FILM COATED ORAL at 03:05

## 2023-06-20 RX ADMIN — MORPHINE SULFATE 4 MG: 4 INJECTION, SOLUTION INTRAMUSCULAR; INTRAVENOUS at 01:30

## 2023-06-20 RX ADMIN — SODIUM CHLORIDE 1000 ML: 9 INJECTION, SOLUTION INTRAVENOUS at 01:26

## 2023-06-20 RX ADMIN — ONDANSETRON 4 MG: 2 INJECTION INTRAMUSCULAR; INTRAVENOUS at 01:30

## 2023-06-20 ASSESSMENT — ACTIVITIES OF DAILY LIVING (ADL): ADLS_ACUITY_SCORE: 35

## 2023-06-20 NOTE — DISCHARGE INSTRUCTIONS
Imodium as needed for diarrhea  Tylenol 650 mg every 4 hours as needed for pain  Make Sure you drink plenty of fluids daily to keep up with your fluid losses from your diarrhea  We have sent a stool culture to evaluate for abnormal bacteria in your colon.  Follow-up with your primary care doctor within the next couple days for recheck  Return to the emergency department for worsening problems or concerns

## 2023-06-20 NOTE — ED TRIAGE NOTES
Pt reports N/Diarrhea since Thursday. Pt c/o lower abdominal pain. Denies emesis. PT noted to have irregular HR in Triage. EKG ordered and completed. Pt noted to have  Multiple PVCs. Pt denies any chest pain     Triage Assessment     Row Name 06/19/23 2770       Triage Assessment (Adult)    Airway WDL WDL       Respiratory WDL    Respiratory WDL WDL       Skin Circulation/Temperature WDL    Skin Circulation/Temperature WDL WDL       Cardiac WDL    Cardiac WDL X;rhythm    Pulse Rate & Regularity radial pulse irregular       Peripheral/Neurovascular WDL    Peripheral Neurovascular WDL WDL       Cognitive/Neuro/Behavioral WDL    Cognitive/Neuro/Behavioral WDL WDL

## 2023-06-28 LAB
C COLI+JEJUNI+LARI FUSA STL QL NAA+PROBE: DETECTED
EC STX1 GENE STL QL NAA+PROBE: NOT DETECTED
EC STX2 GENE STL QL NAA+PROBE: NOT DETECTED
NOROV GI+II ORF1-ORF2 JNC STL QL NAA+PR: NOT DETECTED
RVA NSP5 STL QL NAA+PROBE: NOT DETECTED
SALMONELLA SP RPOD STL QL NAA+PROBE: NOT DETECTED
SHIGELLA SP+EIEC IPAH STL QL NAA+PROBE: NOT DETECTED
V CHOL+PARA RFBL+TRKH+TNAA STL QL NAA+PR: NOT DETECTED
Y ENTERO RECN STL QL NAA+PROBE: NOT DETECTED

## 2023-11-13 ENCOUNTER — HOSPITAL ENCOUNTER (EMERGENCY)
Facility: CLINIC | Age: 73
Discharge: HOME | End: 2023-11-13
Attending: EMERGENCY MEDICINE | Admitting: EMERGENCY MEDICINE
Payer: COMMERCIAL

## 2023-11-13 ENCOUNTER — APPOINTMENT (OUTPATIENT)
Dept: ULTRASOUND IMAGING | Facility: CLINIC | Age: 73
End: 2023-11-13
Payer: COMMERCIAL

## 2023-11-13 VITALS
BODY MASS INDEX: 25.82 KG/M2 | TEMPERATURE: 98.4 F | WEIGHT: 160 LBS | DIASTOLIC BLOOD PRESSURE: 82 MMHG | HEART RATE: 83 BPM | SYSTOLIC BLOOD PRESSURE: 144 MMHG | RESPIRATION RATE: 18 BRPM | OXYGEN SATURATION: 98 %

## 2023-11-13 DIAGNOSIS — M79.661 RIGHT CALF PAIN: ICD-10-CM

## 2023-11-13 PROCEDURE — 99284 EMERGENCY DEPT VISIT MOD MDM: CPT | Mod: 25

## 2023-11-13 PROCEDURE — 93971 EXTREMITY STUDY: CPT | Mod: RT

## 2023-11-13 PROCEDURE — 250N000013 HC RX MED GY IP 250 OP 250 PS 637

## 2023-11-13 RX ORDER — OXYCODONE HYDROCHLORIDE 5 MG/1
5 TABLET ORAL ONCE
Status: COMPLETED | OUTPATIENT
Start: 2023-11-13 | End: 2023-11-13

## 2023-11-13 RX ADMIN — OXYCODONE HYDROCHLORIDE 5 MG: 5 TABLET ORAL at 16:38

## 2023-11-13 NOTE — ED PROVIDER NOTES
Emergency Department Encounter   NAME: Carlita Allen ; AGE: 73 year old female ; YOB: 1950 ; MRN: [id number] ; PCP: Brock Escobedo   ED PROVIDER: Halle Varela PA-C    Evaluation Date & Time:   No admission date for patient encounter.    CHIEF COMPLAINT:  Leg Pain      Impression and Plan   Medical Decision Making    Carlita Allen is a 73 year old female who presents for evaluation of right calf pain x 3 hours. States that at around 12pm she was standing holding a bag of something when she felt 10/10 pain in their calf. It is worse with standing and with flexing the foot but hurts at rest as well. Also notes calf swelling. Denies recent travel, surgeries, or immobilization states. Denies hx blood clots. Not on anticoagulants. Denies chest pain, SOB, fevers, vomiting, dizziness. Never had anything like this happen in the past.     Vitals unremarkable. On exam right calf mildly more swollen than the left with tenderness to palpation. Pain worsened with dorsiflexion of the calf.. lungs CTA. Pulses 2+ in both extremities and skin warm bilaterally with cap refill <2 seconds.  Differential diagnosis includes but not limited to limb ischemia, DVT, muscular spasm, varicose veins, to name a few..   Emergency department work up included a RLE ultrasound which showed no evidence of DVT. Limb is warm with good DP pulses bilaterally and no erythema, pallor, ecchymosis, or mechanism consistent with trauma/injury.  Symptoms and work up most consistent with a musculoskeletal spasm..   Plan to follow up with PCP for reevaluation.  Patient was discharged in stable condition but instructed to return to the emergency department with any new or worsening of symptoms. Patient expressed understanding, feels comfortable, and is in agreement with this plan. All questions addressed prior to discharge.    History:  Supplemental history from: Documented in chart, if applicable and N/A  External Record(s) reviewed:  Documented in chart, if applicable. and Outpatient Record: listed above if pertinent     Work Up:  Chart documentation includes differential considered and any EKGs or imaging independently interpreted by provider, where specified.  In additional to work up documented, I considered the following work up: Documented in chart, if applicable. and N/A    External consultation:  Discussion of management with another provider: n/a    Complicating factors:  Care impacted by chronic illness: n/a  Care affected by social determinants of health: n/a    Disposition considerations: Discharge. No recommendations on prescription strength medication(s). N/A.      ED COURSE:  0310 I met and introduced myself to the patient. I gathered initial history and performed my physical exam. We discussed plan for initial workup.   0320 I have staffed the patient with Dr. Cristino Dobosn, ED MD, who has evaluated the patient and agrees with all aspects of today's care.           FINAL IMPRESSION:    ICD-10-CM    1. Right calf pain  M79.661           MEDICATIONS GIVEN IN THE EMERGENCY DEPARTMENT:  Medications   oxyCODONE (ROXICODONE) tablet 5 mg (5 mg Oral $Given 11/13/23 1638)       NEW PRESCRIPTIONS STARTED AT TODAY'S ED VISIT:  Discharge Medication List as of 11/13/2023  4:35 PM          HPI   Patient information was obtained from: patient   Use of Intrepreter: N/A     Carlita Allen is a 73 year old female who presents for evaluation of right calf pain x 3 hours. States that at around 12pm she was standing holding a bag of something when she felt 10/10 pain in their calf. It is worse with standing and with dorsiflexing the foot but hurts at rest as well. Also notes calf swelling. Denies recent travel, surgeries, or immobilization states. Denies hx blood clots. Not on anticoagulants. Denies chest pain, SOB, fevers, vomiting, dizziness. Never had anything like this happen in the past.     Medical History     No past medical history on  file.    No past surgical history on file.    No family history on file.    Social History     Tobacco Use    Smoking status: Never    Smokeless tobacco: Never   Substance Use Topics    Alcohol use: No    Drug use: No       amLODIPine (NORVASC) 5 MG tablet  LEVOTHYROXINE SODIUM PO  loratadine (CLARITIN) 10 MG tablet  MYRBETRIQ 25 MG 24 hr tablet  predniSONE (DELTASONE) 20 MG tablet        Physical Exam     First Vitals:  Patient Vitals for the past 24 hrs:   BP Temp Temp src Pulse Resp SpO2 Weight   11/13/23 1411 (!) 144/82 98.4  F (36.9  C) Temporal 83 18 98 % 72.6 kg (160 lb)       PHYSICAL EXAM:   Physical Exam  Constitutional:       General: She is not in acute distress.     Appearance: Normal appearance. She is not ill-appearing.   Cardiovascular:      Rate and Rhythm: Normal rate and regular rhythm.      Pulses: Normal pulses.      Heart sounds: Normal heart sounds.   Pulmonary:      Effort: Pulmonary effort is normal.      Breath sounds: Normal breath sounds.   Musculoskeletal:         General: Swelling and tenderness present. No deformity or signs of injury.      Comments: Mild swelling to the right calf. Tenderness to palpation. No erythema, echhyosis, or pallor. Pulses 2+ bilaterally. Skin warm.   Skin:     General: Skin is warm and dry.      Coloration: Skin is not pale.      Findings: No erythema.   Neurological:      Mental Status: She is alert.      Motor: No weakness.         Results     LAB:  All pertinent labs reviewed and interpreted  Labs Ordered and Resulted from Time of ED Arrival to Time of ED Departure - No data to display    RADIOLOGY:  US Lower Extremity Venous Duplex Right   Final Result   IMPRESSION:   1.  No deep venous thrombosis in the right lower extremity.            Halle Varela PA-C  Emergency Medicine   [facility] EMERGENCY ROOM       Halle Varela PA-C  11/13/23 1942

## 2023-11-13 NOTE — ED PROVIDER NOTES
Emergency Department Midlevel Supervisory Note     I personally saw the patient and performed a substantive portion of the visit including all aspects of the medical decision making.    ED Course:  3:21 PM Halle Varela PA-C staffed patient with me. I agree with their assessment and plan of management, and I will see the patient.  3:23 PM I met with the patient to introduce myself, gather additional history, perform my initial exam, and discuss the plan.   4:27 PM US negative.  Pt updated by Halle, instructed on expected course home cares, follow up.    Brief HPI:     Carlita Allen is a 73 year old female who presents for evaluation of leg pain.    Patient presents for right calf pain about 3 hours. States that at around 12 pm, she was standing holding a bag of something when she felt pain in her calf, rating the pain a 10/10. Reports the pain is worse with standing and with flexing the foot, but hurts at rest as well. Also notes calf swelling. Denies recent travel, surgeries, or immobilization states. Denies hx blood clots. Not on anticoagulants. Denies chest pain, SOB, fevers, vomiting, dizziness. Never had anything like this happen in the past.         I, Deneen Fofana, am serving as a scribe to document services personally performed by Dr. Cristino Dobson, based on my observations and the provider's statements to me.   I, Cristino Dobson, DO attest that Deneen Fofana is acting in a scribe capacity, has observed my performance of the services and has documented them in accordance with my direction.    Brief Physical Exam: BP (!) 144/82   Pulse 83   Temp 98.4  F (36.9  C) (Temporal)   Resp 18   Wt 72.6 kg (160 lb)   SpO2 98%   BMI 25.82 kg/m    Constitutional:  Alert, in no acute distress  EYES: Conjunctivae clear  HENT:  Atraumatic, normocephalic  Respiratory:  Respirations even, unlabored, in no acute respiratory distress  Cardiovascular:  Regular rate and rhythm, good peripheral perfusion  GI: Soft,  "nondistended, nontender, no palpable masses, no rebound, no guarding   Musculoskeletal:  Right calf with mild tenderness/swelling, but compartment soft without erythema/rash.  Distally warm/well perfused with 2+ PT and DP pulse in right LE.  No cyanosis. Range of motion major extremities intact.    Integument: Warm, Dry, No erythema, No rash.   Neurologic:  Alert & oriented, no focal deficits noted  Psych: Normal mood and affect     MDM:  Pt seen in conjunction with SUSAN Halle Varela.  Pt here with new right leg/calf pain and some mild swelling that occurred when she was standing, bent over to empty some dirt.  Feels like muscles \"twisted/contorted\". Denies any direct trauma/fall. She has no history of dvt/pe or recent obvious risk factors.  Intact pulses with normal perfusion on exam with nothing to suggest acute arterial ischemia. Agree with US to rule out DVT given unilateral symptoms with minimal provoking injury. Anticipate eventual discharge, outpatient follow up.       1. Right calf pain        Labs and Imaging:  Results for orders placed or performed during the hospital encounter of 11/13/23   US Lower Extremity Venous Duplex Right    Impression    IMPRESSION:  1.  No deep venous thrombosis in the right lower extremity.     I have reviewed the relevant laboratory and radiology studies    Procedures:  I was present for the key portions of this procedure: none    CHRISTOPHER Torres.O.  [facility] EMERGENCY ROOM  [address] [address] [phone]     Cristino Dobson MD  11/13/23 1627    "

## 2023-11-13 NOTE — DISCHARGE INSTRUCTIONS
You were seen in the ED today for right calf pain. We did an ultrasound of the leg which showed no evidence of a blood clot. This is likely musculoskeletal in nature. Recommending ice, rest, and ibuprofen 600mg every 6-8 hours as needed for the pain. Can alternate with Tylenol 1000mg every 4-6   hours as needed for pain. Please follow up with primary care provider for further evaluation. Please return to the ED if symptoms are not resolving and you are unable to get in with your primary care provider or if you develop chest pain, shortness of breath, vomiting, passing out, or any other concerning symptoms.

## 2023-11-13 NOTE — ED TRIAGE NOTES
"Pt presents to the ED with c/o right leg pain that started today. Pt reports that she was standing up, had sudden onset pain in right LE. Pt reports it felt like \"muscles were jumping around\". Reports posterior calf pain that is spreading up towards thigh. Endorses nausea d/t the pain.      Triage Assessment (Adult)       Row Name 11/13/23 1269          Triage Assessment    Airway WDL WDL        Respiratory WDL    Respiratory WDL WDL        Skin Circulation/Temperature WDL    Skin Circulation/Temperature WDL WDL        Cardiac WDL    Cardiac WDL WDL        Peripheral/Neurovascular WDL    Peripheral Neurovascular WDL WDL        Cognitive/Neuro/Behavioral WDL    Cognitive/Neuro/Behavioral WDL WDL                     "

## 2024-04-13 ENCOUNTER — HEALTH MAINTENANCE LETTER (OUTPATIENT)
Age: 74
End: 2024-04-13

## 2024-06-22 ENCOUNTER — HEALTH MAINTENANCE LETTER (OUTPATIENT)
Age: 74
End: 2024-06-22

## 2024-07-16 ENCOUNTER — HOSPITAL ENCOUNTER (EMERGENCY)
Facility: CLINIC | Age: 74
Discharge: HOME | End: 2024-07-16
Attending: EMERGENCY MEDICINE | Admitting: EMERGENCY MEDICINE
Payer: COMMERCIAL

## 2024-07-16 ENCOUNTER — NURSE TRIAGE (OUTPATIENT)
Dept: NURSING | Facility: CLINIC | Age: 74
End: 2024-07-16
Payer: COMMERCIAL

## 2024-07-16 VITALS
DIASTOLIC BLOOD PRESSURE: 80 MMHG | OXYGEN SATURATION: 98 % | HEIGHT: 67 IN | TEMPERATURE: 97.8 F | BODY MASS INDEX: 25.9 KG/M2 | RESPIRATION RATE: 16 BRPM | HEART RATE: 72 BPM | SYSTOLIC BLOOD PRESSURE: 163 MMHG | WEIGHT: 165 LBS

## 2024-07-16 DIAGNOSIS — U07.1 COVID-19 VIRUS INFECTION: ICD-10-CM

## 2024-07-16 LAB
CREAT SERPL-MCNC: 0.99 MG/DL (ref 0.51–0.95)
EGFRCR SERPLBLD CKD-EPI 2021: 61 ML/MIN/1.73M2
FLUAV RNA SPEC QL NAA+PROBE: NEGATIVE
FLUBV RNA RESP QL NAA+PROBE: NEGATIVE
RSV RNA SPEC NAA+PROBE: NEGATIVE
SARS-COV-2 RNA RESP QL NAA+PROBE: POSITIVE

## 2024-07-16 PROCEDURE — 82565 ASSAY OF CREATININE: CPT | Performed by: EMERGENCY MEDICINE

## 2024-07-16 PROCEDURE — 36415 COLL VENOUS BLD VENIPUNCTURE: CPT | Performed by: EMERGENCY MEDICINE

## 2024-07-16 PROCEDURE — 99283 EMERGENCY DEPT VISIT LOW MDM: CPT

## 2024-07-16 PROCEDURE — 87637 SARSCOV2&INF A&B&RSV AMP PRB: CPT | Performed by: EMERGENCY MEDICINE

## 2024-07-16 ASSESSMENT — COLUMBIA-SUICIDE SEVERITY RATING SCALE - C-SSRS
6. HAVE YOU EVER DONE ANYTHING, STARTED TO DO ANYTHING, OR PREPARED TO DO ANYTHING TO END YOUR LIFE?: NO
1. IN THE PAST MONTH, HAVE YOU WISHED YOU WERE DEAD OR WISHED YOU COULD GO TO SLEEP AND NOT WAKE UP?: NO
2. HAVE YOU ACTUALLY HAD ANY THOUGHTS OF KILLING YOURSELF IN THE PAST MONTH?: NO

## 2024-07-16 ASSESSMENT — ENCOUNTER SYMPTOMS
SORE THROAT: 1
COUGH: 1
RHINORRHEA: 1

## 2024-07-16 NOTE — TELEPHONE ENCOUNTER
Patient tested positive for covid today 7/16/2024.  Patient is requesting treatment.        COVID Positive/Requesting COVID treatment    Patient is positive for COVID and requesting treatment options.    Date of positive COVID test (PCR or at home)? Home 7/16/2024  Current COVID symptoms: fever or chills, cough, headache, sore throat, and congestion or runny nose  Date COVID symptoms began: July 14, 2024.    Message should be routed to clinic RN pool. Best phone number to use for call back: [phone].       Reason for Disposition   [1] HIGH RISK patient (e.g., weak immune system, age > 64 years, obesity with BMI 30 or higher, pregnant, chronic lung disease or other chronic medical condition) AND [2] COVID symptoms (e.g., cough, fever)  (Exceptions: Already seen by PCP and no new or worsening symptoms.)    Additional Information   Negative: SEVERE difficulty breathing (e.g., struggling for each breath, speaks in single words)   Negative: Difficult to awaken or acting confused (e.g., disoriented, slurred speech)   Negative: Bluish (or gray) lips or face now   Negative: Shock suspected (e.g., cold/pale/clammy skin, too weak to stand, low BP, rapid pulse)   Negative: Sounds like a life-threatening emergency to the triager   Negative: [1] Diagnosed or suspected COVID-19 AND [2] symptoms lasting 3 or more weeks   Negative: [1] COVID-19 exposure AND [2] no symptoms   Negative: COVID-19 vaccine reaction suspected (e.g., fever, headache, muscle aches) occurring 1 to 3 days after getting vaccine   Negative: COVID-19 vaccine, questions about   Negative: [1] Lives with someone known to have influenza (flu test positive) AND [2] flu-like symptoms (e.g., cough, runny nose, sore throat, SOB; with or without fever)   Negative: [1] Possible COVID-19 symptoms AND [2] triager concerned about severity of symptoms or other causes   Negative: COVID-19 and breastfeeding, questions about   Negative: SEVERE or constant chest pain or  pressure  (Exception: Mild central chest pain, present only when coughing.)   Negative: MODERATE difficulty breathing (e.g., speaks in phrases, SOB even at rest, pulse 100-120)   Negative: [1] Headache AND [2] stiff neck (can't touch chin to chest)   Negative: Oxygen level (e.g., pulse oximetry) 90 percent or lower   Negative: Chest pain or pressure  (Exception: MILD central chest pain, present only when coughing.)   Negative: [1] Drinking very little AND [2] dehydration suspected (e.g., no urine > 12 hours, very dry mouth, very lightheaded)   Negative: Patient sounds very sick or weak to the triager   Negative: MILD difficulty breathing (e.g., minimal/no SOB at rest, SOB with walking, pulse <100)   Negative: Fever > 103 F (39.4 C)   Negative: [1] Fever > 101 F (38.3 C) AND [2] age > 60 years   Negative: [1] Fever > 100.0 F (37.8 C) AND [2] bedridden (e.g., CVA, chronic illness, recovering from surgery)   Negative: Oxygen level (e.g., pulse oximetry) 91 to 94 percent    Protocols used: Coronavirus (COVID-19) Diagnosed or [id number]

## 2024-07-17 NOTE — ED TRIAGE NOTES
Patient presents to ED with positive Covid test today, started feeling like they had allergies flaring up on Sunday, felt worse today with cough, congestion, runny nose.  Rukhsana Bill RN.......7/16/2024 8:32 PM     Triage Assessment (Adult)       Row Name 07/16/24 2030          Triage Assessment    Airway WDL WDL        Respiratory WDL    Respiratory WDL X        Skin Circulation/Temperature WDL    Skin Circulation/Temperature WDL WDL        Cardiac WDL    Cardiac WDL WDL        Peripheral/Neurovascular WDL    Peripheral Neurovascular WDL WDL        Cognitive/Neuro/Behavioral WDL    Cognitive/Neuro/Behavioral WDL WDL

## 2024-07-17 NOTE — DISCHARGE INSTRUCTIONS
Recommend stop amlodipine and Adderall while you are on Paxil bed and restart 72 hours after completion.  Recommend stay at home until you are feeling better.  When you are feeling better wear a mask for 1 week

## 2024-07-17 NOTE — ED NOTES
Reviewed discharge instructions and answered all questions. Please see provider's note for assessment.

## 2024-07-17 NOTE — ED PROVIDER NOTES
EMERGENCY DEPARTMENT ENCOUNTER      NAME: Carlita Allen  AGE: 73 year old female  YOB: 1950  MRN: [id number]  EVALUATION DATE & TIME: No admission date for patient encounter.    PCP: System, Provider Not In    ED PROVIDER: Bandar Conklin MD      Chief Complaint   Patient presents with    Covid Concern         FINAL IMPRESSION:  1. COVID-19 virus infection          ED COURSE & MEDICAL DECISION MAKING:    Pertinent Labs & Imaging studies reviewed. (See chart for details)  73 year old female presents to the Emergency Department for evaluation of requesting COVID testing as well as Paxlovid    Sick x 3 days with URI symptoms    Home test positive.  Try to get on Paxlovid, which patient's previously been on but got run around and ended up in the ER    On exam well-appearing without stridor or wheezing.  Highly doubt ACS, viral pneumonia, pulmonary emboli.    Patient is previously vaccinated against COVID has been on Paxlovid in the past    Is on Adderall and amlodipine which we will have patient hold in the setting of Paxlovid and restart 72 hours after completing Paxlovid    Patient is requesting repeat COVID testing, discussed false negativity PCR is probably higher than false positivity rapid home test    I do not feel chest x-ray is indicated based on history and exam.  Will obtain point-of-care creatinine for dosing back to    ED Course as of 07/16/24 2210 Tue Jul 16, 2024 2032 I spoke to pharmacy regarding the patient.   2145 Creatinine(!): 0.99   2145 GFR Estimate: 61   2147 GFR greater than 60 therefore we will prescribe usual Paxlovid   2206 SARS CoV2 PCR(!): Positive     Plan for discharge home with 5 days of Paxlovid at patient's request.  Discussed COVID-19.       Medical Decision Making  Obtained supplemental history:Supplemental history obtained?: No  Reviewed external records: External records reviewed?: No  Care impacted by chronic illness:Hypertension and Mental Health  Care  significantly affected by social determinants of health:Access to Medical Care  Did you consider but not order tests?: Work up considered but not performed and documented in chart, if applicable  Did you interpret images independently?: Independent interpretation of ECG and images noted in documentation, when applicable.  Consultation discussion with other provider: Discussed with pharmacy  Discharge. I prescribed additional prescription strength medication(s) as charted. N/A.    At the conclusion of the encounter I discussed the results of all of the tests and the disposition. The questions were answered. The patient or family acknowledged understanding and was agreeable with the care plan.       MEDICATIONS GIVEN IN THE EMERGENCY:  Medications - No data to display    NEW PRESCRIPTIONS STARTED AT TODAY'S ER VISIT  New Prescriptions    NIRMATRELVIR AND RITONAVIR (PAXLOVID) 300 MG/100 MG THERAPY PACK    Take 3 tablets by mouth 2 times daily for 5 days          =================================================================    HPI    Patient information was obtained from: the patient    Use of : N/A      Carlita Allen is a 73 year old female with a pertinent history of hypertension, ADD, anxiety, osteoarthritis, VARGAS, hypothyroidism, and chronic fatigue syndrome who presents to this ED for evaluation of COVID concerns.    The patient states that she tested positive for COVID today, 07/16/2024, and would like a retest at the ED. She wants Paxlovid. She endorses cough, runny nose, congestion, and sore throat.  Been sick for 3 days.      REVIEW OF SYSTEMS   Review of Systems   HENT:  Positive for congestion, rhinorrhea and sore throat.    Respiratory:  Positive for cough.         PAST MEDICAL HISTORY:  History reviewed. No pertinent past medical history.    PAST SURGICAL HISTORY:  History reviewed. No pertinent surgical history.        CURRENT MEDICATIONS:    nirmatrelvir and ritonavir (PAXLOVID) 300 mg/100  "mg therapy pack  amLODIPine (NORVASC) 5 MG tablet  LEVOTHYROXINE SODIUM PO  loratadine (CLARITIN) 10 MG tablet  MYRBETRIQ 25 MG 24 hr tablet  predniSONE (DELTASONE) 20 MG tablet        ALLERGIES:  Allergies   Allergen Reactions    Iodinated Contrast Media Other (See Comments)     Contrast dye      Contrast Dye Other (See Comments)    Gadolinium     Lisinopril Nausea and Vomiting and Nausea     Other reaction(s): Gastrointestinal, GI Upset    Losartan Other (See Comments)     Weight gain, abdominal edema, PN: Weight gain, abdominal edema  Weight gain, abdominal edema, PN: Weight gain, abdominal edema  Weight gain, abdominal edema, PN: Weight gain, abdominal edema      Pcn [Penicillins] Other (See Comments)    Gluten Meal Rash       FAMILY HISTORY:  History reviewed. No pertinent family history.    SOCIAL HISTORY:   Social History     Socioeconomic History    Marital status: Single   Tobacco Use    Smoking status: Never    Smokeless tobacco: Never   Substance and Sexual Activity    Alcohol use: No    Drug use: No       VITALS:  BP (!) 155/82   Pulse 72   Temp 97.8  F (36.6  C) (Oral)   Resp 16   Ht 1.702 m (5' 7\")   Wt 74.8 kg (165 lb)   SpO2 97%   BMI 25.84 kg/m      PHYSICAL EXAM      Vitals: BP (!) 155/82   Pulse 72   Temp 97.8  F (36.6  C) (Oral)   Resp 16   Ht 1.702 m (5' 7\")   Wt 74.8 kg (165 lb)   SpO2 97%   BMI 25.84 kg/m    General: Appears in no acute distress, awake, alert, interactive. Sounds congested.   Eyes: Conjunctivae non-injected. Sclera anicteric.  HENT: Atraumatic.  Patient sounds nasally congested  Neck: Supple.  Respiratory/Chest: Respiration unlabored. No wheezing or rhonchi.  Abdomen: non distended  Musculoskeletal: Normal extremities. No edema or erythema.  Skin: Normal color. No rash or diaphoresis.  Neurologic: Face symmetric, moves all extremities spontaneously. Speech clear.  Psychiatric: Oriented to person, place, and time. Affect appropriate.    LAB:  All pertinent labs " reviewed and interpreted.  Results for orders placed or performed during the hospital encounter of 07/16/24   Creatinine   Result Value Ref Range    Creatinine 0.99 (H) 0.51 - 0.95 mg/dL    GFR Estimate 61 >60 mL/min/1.73m2       RADIOLOGY:  Reviewed all pertinent imaging. Please see official radiology report.  No orders to display             I, Bethany Jorge, am serving as a scribe to document services personally performed by Bandar Conklin MD based on my observation and the provider's statements to me. I, Bandar Conklin MD, attest that Bethany Jorge is acting in a scribe capacity, has observed my performance of the services and has documented them in accordance with my direction.    Bandar Conklin MD  [facility] EMERGENCY ROOM  [facility] [facility] [address] [phone]      Bandar Conklin MD  07/16/24 6921

## 2025-07-12 ENCOUNTER — HEALTH MAINTENANCE LETTER (OUTPATIENT)
Age: 75
End: 2025-07-12

## 2025-08-06 ENCOUNTER — OFFICE VISIT (OUTPATIENT)
Dept: URGENT CARE | Facility: URGENT CARE | Age: 75
End: 2025-08-06
Payer: COMMERCIAL

## 2025-08-06 VITALS
HEIGHT: 67 IN | TEMPERATURE: 98.2 F | BODY MASS INDEX: 26.18 KG/M2 | WEIGHT: 166.8 LBS | OXYGEN SATURATION: 95 % | SYSTOLIC BLOOD PRESSURE: 123 MMHG | HEART RATE: 83 BPM | DIASTOLIC BLOOD PRESSURE: 82 MMHG | RESPIRATION RATE: 16 BRPM

## 2025-08-06 DIAGNOSIS — N89.8 VAGINAL IRRITATION: Primary | ICD-10-CM

## 2025-08-06 DIAGNOSIS — L98.9 SKIN SORE: ICD-10-CM

## 2025-08-06 LAB
CLUE CELLS: ABNORMAL
TRICHOMONAS, WET PREP: ABNORMAL
WBC'S/HIGH POWER FIELD, WET PREP: ABNORMAL
YEAST, WET PREP: PRESENT

## 2025-08-06 PROCEDURE — 87210 SMEAR WET MOUNT SALINE/INK: CPT | Performed by: PHYSICIAN ASSISTANT

## 2025-08-06 PROCEDURE — 99203 OFFICE O/P NEW LOW 30 MIN: CPT | Performed by: PHYSICIAN ASSISTANT

## 2025-08-06 RX ORDER — FLUCONAZOLE 150 MG/1
150 TABLET ORAL
Qty: 2 TABLET | Refills: 0 | Status: SHIPPED | OUTPATIENT
Start: 2025-08-06 | End: 2025-08-10

## 2025-08-06 RX ORDER — TRIAMCINOLONE ACETONIDE 1 MG/G
CREAM TOPICAL 2 TIMES DAILY
Qty: 15 G | Refills: 0 | Status: SHIPPED | OUTPATIENT
Start: 2025-08-06 | End: 2025-08-20